# Patient Record
Sex: MALE | Race: WHITE | NOT HISPANIC OR LATINO | Employment: OTHER | URBAN - METROPOLITAN AREA
[De-identification: names, ages, dates, MRNs, and addresses within clinical notes are randomized per-mention and may not be internally consistent; named-entity substitution may affect disease eponyms.]

---

## 2018-09-07 ENCOUNTER — TRANSFERRED RECORDS (OUTPATIENT)
Dept: HEALTH INFORMATION MANAGEMENT | Facility: CLINIC | Age: 68
End: 2018-09-07

## 2018-12-06 ENCOUNTER — OFFICE VISIT (OUTPATIENT)
Dept: CARDIOLOGY | Facility: CLINIC | Age: 68
End: 2018-12-06
Payer: COMMERCIAL

## 2018-12-06 VITALS
DIASTOLIC BLOOD PRESSURE: 88 MMHG | BODY MASS INDEX: 23.86 KG/M2 | OXYGEN SATURATION: 100 % | WEIGHT: 180 LBS | SYSTOLIC BLOOD PRESSURE: 124 MMHG | HEART RATE: 87 BPM | HEIGHT: 73 IN

## 2018-12-06 DIAGNOSIS — I34.0 NON-RHEUMATIC MITRAL REGURGITATION: ICD-10-CM

## 2018-12-06 DIAGNOSIS — I48.20 CHRONIC ATRIAL FIBRILLATION (H): ICD-10-CM

## 2018-12-06 PROCEDURE — 93000 ELECTROCARDIOGRAM COMPLETE: CPT | Performed by: INTERNAL MEDICINE

## 2018-12-06 PROCEDURE — 99214 OFFICE O/P EST MOD 30 MIN: CPT | Mod: 25 | Performed by: INTERNAL MEDICINE

## 2018-12-06 RX ORDER — TAMSULOSIN HYDROCHLORIDE 0.4 MG/1
CAPSULE ORAL DAILY
Refills: 3 | COMMUNITY
Start: 2018-09-07 | End: 2022-09-09

## 2018-12-06 NOTE — MR AVS SNAPSHOT
"              After Visit Summary   12/6/2018    Pan Bills    MRN: [id number]           Patient Information     Date Of Birth          1950        Visit Information        Provider Department      12/6/2018 11:45 AM Austyn Calderon MD [facility]        Today's Diagnoses     Non-rheumatic mitral regurgitation        Chronic atrial fibrillation (H)           Follow-ups after your visit        Additional Services     Follow-Up with Cardiologist                 Future tests that were ordered for you today     Open Future Orders        Priority Expected Expires Ordered    Follow-Up with Cardiologist Routine 12/5/2020 12/25/2020 12/6/2018    Echocardiogram Routine 12/13/2018 12/6/2019 12/6/2018            Who to contact     If you have questions or need follow up information about today's clinic visit or your schedule please contact [facility]   [place] directly at [phone].  Normal or non-critical lab and imaging results will be communicated to you by MyChart, letter or phone within 4 business days after the clinic has received the results. If you do not hear from us within 7 days, please contact the clinic through MyChart or phone. If you have a critical or abnormal lab result, we will notify you by phone as soon as possible.  Submit refill requests through [employer] or call your pharmacy and they will forward the refill request to us. Please allow 3 business days for your refill to be completed.          Additional Information About Your Visit        Care EveryWhere ID     This is your Care EveryWhere ID. This could be used by other organizations to access your [facility] medical records  [id number]        Your Vitals Were     Pulse Height Pulse Oximetry BMI (Body Mass Index)          87 1.854 m (6' 1\") 100% 23.75 kg/m2         Blood Pressure from Last 3 Encounters:   12/06/18 124/88   07/06/16 114/80   06/30/16 110/88    Weight from " Last 3 Encounters:   12/06/18 81.6 kg (180 lb)   07/06/16 82.8 kg (182 lb 9 oz)   06/30/16 83.9 kg (185 lb)              We Performed the Following     EKG 12-lead complete w/read - Clinics (performed today)     Follow-Up with Cardiologist        Primary Care Provider Office Phone # Fax #    Sheeba Alex [phone] [address] [address]        Equal Access to Services     [facility] : Hadii aad ku hadasho Soomaali, waaxda luqadaha, qaybta kaalmada adeegyada, waxay idiin hayaan adeeg kharash lamanoj . So [facility] [phone].    ATENCIÓN: Si habla español, tiene a jacinto disposición servicios gratuitos de asistencia lingüística. Llame al [phone].    We comply with applicable federal civil rights laws and [place] laws. We do not discriminate on the basis of race, color, national origin, age, disability, sex, sexual orientation, or gender identity.            Thank you!     Thank you for choosing [facility]  for your care. Our goal is always to provide you with excellent care. Hearing back from our patients is one way we can continue to improve our services. Please take a few minutes to complete the written survey that you may receive in the mail after your visit with us. Thank you!             Your Updated Medication List - Protect others around you: Learn how to safely use, store and throw away your medicines at www.disposemymeds.org.          This list is accurate as of 12/6/18 12:19 PM.  Always use your most recent med list.                   Brand Name Dispense Instructions for use Diagnosis    tamsulosin 0.4 MG capsule    FLOMAX     daily

## 2018-12-06 NOTE — LETTER
12/6/2018      Sheeba Alex MD  [address] [address]      RE: Pan Bills       Dear Colleague,    I had the pleasure of seeing Pan Bills in the [facility].    Service Date: 12/06/2018      HISTORY OF PRESENT ILLNESS:  Pan Bills, a 68-year-old man with chronic atrial fibrillation, mitral insufficiency, and prostatism was seen today at your request for followup.      Since I last saw Mr. Bills there has been no change in his  excellent exercise tolerance.  He runs at least 3 times a week and can run up to 20 miles at a time..  There has been no chest, arm, neck, jaw or back discomfort with activity.  There has been no unusual dyspnea, edema or orthopnea.  He denies focal neurologic deficit.      PAST MEDICAL HISTORY:   1.  Prostatism - on tamsulosin.   2.  Old history of colon cancer - status post surgery with no recurrence.   3.  Status post umbilical herniorrhaphy.   4.  Chronic atrial fibrillation.   5.  Chronic mitral insufficiency - most recent echo showing normal ejection fraction with moderate mitral insufficiency in 2016.      PHYSICAL EXAMINATION:   GENERAL:  Exam today demonstrates a very pleasant, cooperative and intelligent, soft spoken 68-year-old man.   VITAL SIGNS:  His blood pressure is 128/66, his heart rate was 87 and irregular.  His height is 1.85 meters, his weight is 81.6 kg.  His BMI is 23.8.   LUNGS:  Clear to percussion and auscultation.   CARDIOVASCULAR:  Shows an irregular S1 and S2.  There is no S3.  There is a very soft 1/6 systolic murmur.  His pulses are symmetrical.   EXTREMITIES:  There is no pedal edema.      LABORATORY STUDIES:  His ECG shows atrial fibrillation with a ventricular response rate of 78 beats per minute and an incomplete right bundle branch block pattern.  His ECG is not significantly changed.      ASSESSMENT:  Mr. Bills has chronic atrial fibrillation with a CHADS-VASc score equal to 1.  He is at low  risk for thromboembolic event and in the absence of bleeding complication, daily aspirin therapy is reasonable..  In view of his history of moderate mitral insufficiency, a repeat echo is indicated at this time to make certain there has been no change in the degree of mitral insufficiency or in left ventricular chamber size/ systolic performance.  We will plan to see the patient in about 2 years, unless there is a change on his echo.      RECOMMENDATIONS:   1.  Continue aspirin daily.   2.  Echocardiogram to reassess the degree of mitral insufficiency, left ventricular systolic performance and cardiac chamber size.  We will contact the patient with the results and request an earlier visit should we see significant change.      We greatly appreciate the opportunity to be involved in the care of this most pleasant man.      cc:   Sheeba Alex MD   [facility]   [address] [place] [address]         AUSTYN CALDERON MD             D: 2018   T: 2018   MT: JIL      Name:     MARIFER GUTIERREZ   MRN:      [id number]        Account:      [id number]   :      1950           Service Date: 2018      Document: [id number]           Outpatient Encounter Medications as of 2018   Medication Sig Dispense Refill     tamsulosin (FLOMAX) 0.4 MG capsule daily  3     [DISCONTINUED] aspirin 81 MG chewable tablet Take 81 mg by mouth daily       No facility-administered encounter medications on file as of 2018.        Again, thank you for allowing me to participate in the care of your patient.      Sincerely,    Austyn Calderon MD     [facility]

## 2018-12-06 NOTE — LETTER
"12/6/2018    Sheeba Alex MD  [address]  [facility] [address]    RE: Pan Bills       Dear Colleague,    I had the pleasure of seeing Pan Bills in the [facility].    HISTORY OF PRESENT ILLNESS:  Asymptomatic. Still runs at least 3 times /week.     Orders this Visit:  Orders Placed This Encounter   Procedures     EKG 12-lead complete w/read - Clinics (performed today)     Echocardiogram     Orders Placed This Encounter   Medications     tamsulosin (FLOMAX) 0.4 MG capsule     Sig: daily     Refill:  3     Medications Discontinued During This Encounter   Medication Reason     aspirin 81 MG chewable tablet Medication Reconciliation Clean Up       Encounter Diagnoses   Name Primary?     Non-rheumatic mitral regurgitation      Chronic atrial fibrillation (H)        CURRENT MEDICATIONS:  Current Outpatient Prescriptions   Medication Sig Dispense Refill     tamsulosin (FLOMAX) 0.4 MG capsule daily  3       ALLERGIES   No Known Allergies    PAST MEDICAL, SURGICAL, FAMILY, SOCIAL HISTORY:  History was reviewed and updated as needed, see medical record.    Review of Systems:  A 12-point review of systems was completed, see medical record for detailed review of systems information.    Physical Exam:  Vitals: /88 (BP Location: Right arm, Patient Position: Chair, Cuff Size: Adult Regular)  Pulse 87  Ht 1.854 m (6' 1\")  Wt 81.6 kg (180 lb)  SpO2 100%  BMI 23.75 kg/m2    Constitutional:  cooperative, alert and oriented, well developed, well nourished, in no acute distress        Skin:  warm and dry to the touch, no apparent skin lesions or masses noted        Head:  normocephalic, no masses or lesions        Eyes:  pupils equal and round, conjunctivae and lids unremarkable, sclera white, no xanthalasma, EOMS intact, no nystagmus        ENT:  no pallor or cyanosis, dentition good        Neck:  carotid pulses are full and equal bilaterally, JVP normal, no carotid bruit    "     Chest:  normal breath sounds, clear to auscultation, normal A-P diameter, normal symmetry, normal respiratory excursion, no use of accessory muscles        Cardiac: normal S1 and S2 irregular rhythm                Abdomen:  abdomen soft, non-tender, BS normoactive, no mass, no HSM, no bruits        Vascular:                                        Extremities and Back:  no deformities, clubbing, cyanosis, erythema observed        Neurological:  no gross motor deficits        ASSESSMENT:  Looks great.        RECOMMENDATIONS: TTE to recheck MR  Follow-up in 2 years unless change.  Asa 81 daily.       Recent Lab Results:  LIPID RESULTS:  Lab Results   Component Value Date    CHOL 170 05/05/2014    HDL 67 05/05/2014    LDL 91 05/05/2014    TRIG 62 05/05/2014       LIVER ENZYME RESULTS:  Lab Results   Component Value Date    AST 26 05/05/2014    ALT 20 05/05/2014       CBC RESULTS:  No results found for: WBC, RBC, HGB, HCT, MCV, MCH, MCHC, RDW, PLT    BMP RESULTS:  Lab Results   Component Value Date     05/05/2014    POTASSIUM 4.3 05/05/2014    CHLORIDE 100 05/05/2014    ANIONGAP 1.7 05/05/2014    GLC 87 05/05/2014    BUN 18 05/05/2014    CR 1.00 05/05/2014    CAROLE 9.4 05/05/2014        A1C RESULTS:  No results found for: A1C    INR RESULTS:  No results found for: INR    We greatly appreciate the opportunity to be involved in the care of your patient, Pan Bills.    Sincerely,  Austyn Calderon MD      CC  Sheeba Alex MD  [address]  [place] [address]                                                                       Thank you for allowing me to participate in the care of your patient.      Sincerely,     Austyn Calderon MD     [facility]    cc:   Sheeba Alex MD  [address]  [place] [address]

## 2018-12-06 NOTE — PROGRESS NOTES
Service Date: 12/06/2018      HISTORY OF PRESENT ILLNESS:  Pan Bills, a 68-year-old man with chronic atrial fibrillation, mitral insufficiency, and prostatism was seen today at your request for followup.      Since I last saw Mr. Bills there has been no change in his  excellent exercise tolerance.  He runs at least 3 times a week and can run up to 20 miles at a time..  There has been no chest, arm, neck, jaw or back discomfort with activity.  There has been no unusual dyspnea, edema or orthopnea.  He denies focal neurologic deficit.      PAST MEDICAL HISTORY:   1.  Prostatism - on tamsulosin.   2.  Old history of colon cancer - status post surgery with no recurrence.   3.  Status post umbilical herniorrhaphy.   4.  Chronic atrial fibrillation.   5.  Chronic mitral insufficiency - most recent echo showing normal ejection fraction with moderate mitral insufficiency in 2016.      PHYSICAL EXAMINATION:   GENERAL:  Exam today demonstrates a very pleasant, cooperative and intelligent, soft spoken 68-year-old man.   VITAL SIGNS:  His blood pressure is 128/66, his heart rate was 87 and irregular.  His height is 1.85 meters, his weight is 81.6 kg.  His BMI is 23.8.   LUNGS:  Clear to percussion and auscultation.   CARDIOVASCULAR:  Shows an irregular S1 and S2.  There is no S3.  There is a very soft 1/6 systolic murmur.  His pulses are symmetrical.   EXTREMITIES:  There is no pedal edema.      LABORATORY STUDIES:  His ECG shows atrial fibrillation with a ventricular response rate of 78 beats per minute and an incomplete right bundle branch block pattern.  His ECG is not significantly changed.      ASSESSMENT:  Mr. Bills has chronic atrial fibrillation with a CHADS-VASc score equal to 1.  He is at low risk for thromboembolic event and in the absence of bleeding complication, daily aspirin therapy is reasonable..  In view of his history of moderate mitral insufficiency, a repeat echo is indicated at this time to make  certain there has been no change in the degree of mitral insufficiency or in left ventricular chamber size/ systolic performance.  We will plan to see the patient in about 2 years, unless there is a change on his echo.      RECOMMENDATIONS:   1.  Continue aspirin daily.   2.  Echocardiogram to reassess the degree of mitral insufficiency, left ventricular systolic performance and cardiac chamber size.  We will contact the patient with the results and request an earlier visit should we see significant change.      We greatly appreciate the opportunity to be involved in the care of this most pleasant man.      cc:   Sheeba Alex MD   [facility]   [address] [place] [address]         CANDICE NEGRETE MD             D: 2018   T: 2018   MT: JIL      Name:     MARIFER GUTIERREZ   MRN:      [id number]        Account:      [id number]   :      1950           Service Date: 2018      Document: [id number]

## 2018-12-06 NOTE — PROGRESS NOTES
"HISTORY OF PRESENT ILLNESS:  Asymptomatic. Still runs at least 3 times /week.     Orders this Visit:  Orders Placed This Encounter   Procedures     EKG 12-lead complete w/read - Clinics (performed today)     Echocardiogram     Orders Placed This Encounter   Medications     tamsulosin (FLOMAX) 0.4 MG capsule     Sig: daily     Refill:  3     Medications Discontinued During This Encounter   Medication Reason     aspirin 81 MG chewable tablet Medication Reconciliation Clean Up       Encounter Diagnoses   Name Primary?     Non-rheumatic mitral regurgitation      Chronic atrial fibrillation (H)        CURRENT MEDICATIONS:  Current Outpatient Prescriptions   Medication Sig Dispense Refill     tamsulosin (FLOMAX) 0.4 MG capsule daily  3       ALLERGIES   No Known Allergies    PAST MEDICAL, SURGICAL, FAMILY, SOCIAL HISTORY:  History was reviewed and updated as needed, see medical record.    Review of Systems:  A 12-point review of systems was completed, see medical record for detailed review of systems information.    Physical Exam:  Vitals: /88 (BP Location: Right arm, Patient Position: Chair, Cuff Size: Adult Regular)  Pulse 87  Ht 1.854 m (6' 1\")  Wt 81.6 kg (180 lb)  SpO2 100%  BMI 23.75 kg/m2    Constitutional:  cooperative, alert and oriented, well developed, well nourished, in no acute distress        Skin:  warm and dry to the touch, no apparent skin lesions or masses noted        Head:  normocephalic, no masses or lesions        Eyes:  pupils equal and round, conjunctivae and lids unremarkable, sclera white, no xanthalasma, EOMS intact, no nystagmus        ENT:  no pallor or cyanosis, dentition good        Neck:  carotid pulses are full and equal bilaterally, JVP normal, no carotid bruit        Chest:  normal breath sounds, clear to auscultation, normal A-P diameter, normal symmetry, normal respiratory excursion, no use of accessory muscles        Cardiac: normal S1 and S2 irregular rhythm            "     Abdomen:  abdomen soft, non-tender, BS normoactive, no mass, no HSM, no bruits        Vascular:                                        Extremities and Back:  no deformities, clubbing, cyanosis, erythema observed        Neurological:  no gross motor deficits        ASSESSMENT:  Looks great.        RECOMMENDATIONS: TTE to recheck MR  Follow-up in 2 years unless change.  Asa 81 daily.       Recent Lab Results:  LIPID RESULTS:  Lab Results   Component Value Date    CHOL 170 05/05/2014    HDL 67 05/05/2014    LDL 91 05/05/2014    TRIG 62 05/05/2014       LIVER ENZYME RESULTS:  Lab Results   Component Value Date    AST 26 05/05/2014    ALT 20 05/05/2014       CBC RESULTS:  No results found for: WBC, RBC, HGB, HCT, MCV, MCH, MCHC, RDW, PLT    BMP RESULTS:  Lab Results   Component Value Date     05/05/2014    POTASSIUM 4.3 05/05/2014    CHLORIDE 100 05/05/2014    ANIONGAP 1.7 05/05/2014    GLC 87 05/05/2014    BUN 18 05/05/2014    CR 1.00 05/05/2014    CAROLE 9.4 05/05/2014        A1C RESULTS:  No results found for: A1C    INR RESULTS:  No results found for: INR    We greatly appreciate the opportunity to be involved in the care of your patient, Pan Bills.    Sincerely,  Austyn Calderon MD      CC  Sheeba Alex MD  [address] [place] [address]  [place] [address]

## 2018-12-20 ENCOUNTER — HOSPITAL ENCOUNTER (OUTPATIENT)
Dept: CARDIOLOGY | Facility: CLINIC | Age: 68
Discharge: HOME | End: 2018-12-20
Attending: INTERNAL MEDICINE | Admitting: INTERNAL MEDICINE
Payer: MEDICARE

## 2018-12-20 DIAGNOSIS — I34.0 NON-RHEUMATIC MITRAL REGURGITATION: ICD-10-CM

## 2018-12-20 DIAGNOSIS — I48.20 CHRONIC ATRIAL FIBRILLATION (H): ICD-10-CM

## 2018-12-20 PROCEDURE — 93306 TTE W/DOPPLER COMPLETE: CPT | Mod: 26 | Performed by: INTERNAL MEDICINE

## 2018-12-20 PROCEDURE — 93306 TTE W/DOPPLER COMPLETE: CPT

## 2018-12-21 ENCOUNTER — TELEPHONE (OUTPATIENT)
Dept: CARDIOLOGY | Facility: CLINIC | Age: 68
End: 2018-12-21

## 2018-12-21 NOTE — TELEPHONE ENCOUNTER
Received the following result note from Dr. Calderon:     Notes recorded by Austyn Calderon MD on 12/21/2018 at 8:09 AM CST  Please inform the patient I have personally reviewed his TTE. He has moderate MR, unchanged from last TTE with excellent LVEF and normal chamber sizes. We will plan to follow up in 2 years as stated in note.Kvng    Called and spoke with pt. Communicated results and recommendations as stated by Dr. Calderon. Pt verbalized understanding, no further questions at this time.

## 2022-08-26 ENCOUNTER — HOSPITAL ENCOUNTER (OUTPATIENT)
Dept: ULTRASOUND IMAGING | Facility: CLINIC | Age: 72
Discharge: HOME | End: 2022-08-26
Attending: FAMILY MEDICINE | Admitting: FAMILY MEDICINE
Payer: COMMERCIAL

## 2022-08-26 DIAGNOSIS — M79.661 RIGHT CALF PAIN: ICD-10-CM

## 2022-08-26 PROCEDURE — 93971 EXTREMITY STUDY: CPT | Mod: RT

## 2022-09-09 ENCOUNTER — OFFICE VISIT (OUTPATIENT)
Dept: VASCULAR SURGERY | Facility: CLINIC | Age: 72
End: 2022-09-09
Payer: COMMERCIAL

## 2022-09-09 DIAGNOSIS — I83.891 VARICOSE VEINS OF LEG WITH SWELLING, RIGHT: Primary | ICD-10-CM

## 2022-09-09 PROCEDURE — 99203 OFFICE O/P NEW LOW 30 MIN: CPT | Performed by: SURGERY

## 2022-09-09 RX ORDER — ASPIRIN 81 MG/1
81 TABLET, CHEWABLE ORAL DAILY
COMMUNITY

## 2022-09-09 NOTE — NURSING NOTE
Patient Reported symptoms:    Right leg   Heaviness A little of the time   Achiness A little of the time   Swelling All of the time  Throbbing None of the time   Itching None of the time   Appearance Extremely noticeable   Impact on work/activities Symptoms but full able to participate    Left Leg   Heaviness A little of the time   Achiness A little of the time   Swelling A little of the time   Throbbing None of the time   Itching None of the time   Appearance Extremely noticeable   Impact on work/activities Symptoms but full able to participate

## 2022-09-09 NOTE — LETTER
9/9/2022         RE: Pan Bills  [address] [facility] [address]        Dear Colleague,    Thank you for referring your patient, Pan Bills, to the [facility] [place]. Please see a copy of my visit note below.    VEINSOLUTIONS CONSULTATION    HPI:    Pan Bills is a pleasant 71 year old male referred by Dr. Sheeba Alex for evaluation of increased swelling of his right lower extremity.  I treated the patient  In 2012 bilaterally for chronic venous insufficiency.  He says he is always had swelling of his right lower extremity but that he slipped and bumped the lateral aspect of the distal right leg on his deck causing significant swelling of his right lower extremity.  He has had no pleuritic chest pain, shortness of breath and has continue to wear compression on a daily basis as he always has.  Unfortunately, the swelling has been more difficult to control.  The swelling of the leg makes it difficult for him to run, causing him to have to ice his leg on a daily basis.  Icing helps control the swelling somewhat.    He had a right lower extremity ultrasound to rule out a deep vein thrombosis on 8/26/2022 which was negative for deep vein thrombosis.    The patient has no history of deep a vein thrombosis, superficial thrombophlebitis or hemorrhage.    Family history is significant for varicose veins.    PAST MEDICAL HISTORY:   Past Medical History:   Diagnosis Date     Atrial fibrillation (H)      Cancer (H)     COLON     Mitral valve regurgitation     Mod-severe MR (2-3+) per 5/14 echo       PAST SURGICAL HISTORY:   Past Surgical History:   Procedure Laterality Date     COLONOSCOPY N/A 10/1/2015    Procedure: COLONOSCOPY;  Surgeon: Markus Moore MD;  Location:  GI     GI SURGERY      partial colon resection for CA     HERNIA REPAIR      (R) inguinal  hernia repair     HERNIORRHAPHY EPIGASTRIC N/A 7/6/2016    Procedure: HERNIORRHAPHY EPIGASTRIC;  Surgeon:  Shorty Salomon MD;  Location: [facility]     HERNIORRHAPHY INGUINAL Right 2016    Procedure: HERNIORRHAPHY INGUINAL;  Surgeon: Shorty Salomon MD;  Location: [facility]     VASCULAR SURGERY      VARICOSE VEIN SURGERY BILAT       FAMILY HISTORY:   Family History   Problem Relation Age of Onset     Respiratory Mother      Cancer Father 65        Lung CA     Diabetes Maternal Grandmother      Cardiovascular Brother         Royal Vazquez       SOCIAL HISTORY:   Social History     Tobacco Use     Smoking status: Current Some Day Smoker     Packs/day: 0.50     Years: 2.00     Pack years: 1.00     Types: Cigars     Last attempt to quit: 1969     Years since quittin.7     Smokeless tobacco: Never Used     Tobacco comment: pt smokes 1 cigar per week   Substance Use Topics     Alcohol use: No     Alcohol/week: 0.0 standard drinks       REVIEW OF SYSTEMS: Review Of Systems  Skin: negative  Eyes: negative  Ears/Nose/Throat: negative  Respiratory: No shortness of breath, dyspnea on exertion, cough, or hemoptysis  Cardiovascular: irregular heart beat  Gastrointestinal: negative  Genitourinary: negative  Musculoskeletal: Leg swelling  Neurologic: negative  Psychiatric: negative  Hematologic/Lymphatic/Immunologic: negative  Endocrine: negative      Vital signs:  There were no vitals taken for this visit.    Current Outpatient Medications   Medication Sig Dispense Refill     aspirin (ASA) 81 MG chewable tablet Take 81 mg by mouth daily         PHYSICAL EXAM:  General: Pleasant, NAD.   HEENT: Normocephalic, atraumatic, external ears and nose normal.   Respiratory: Normal respiratory effort.   Cardiovascular: Pulse is regular.   Musculoskeletal: Gait and station normal.  The joints of his fingers and toes without deformity.  There is no cyanosis of his nailbeds.   EXTREMITIES: Right lower extremity: Scattered 4 to 5 mm varicosities over the distal thigh and proximal calf.  3+ edema of the leg from the mid leg to the  ankle with hyperpigmentation over the distal medial right leg.  There is a scar on the distal medial right leg but he is not sure if he is ever had an ulcer.    Left lower extremity: 4 to 5 mm varicosities from the distal medial left thigh coursing down the medial left calf.  1+ edema of the left ankle with some mild stasis hyperpigmentation..    PULSES: R/L (3=normal pulse, 0=no palpable pulse) dorsalis pedis: 2/0; posterior tibial: 3/3.      Neurologic: Grossly normal  Psychiatric: Mood, affect, judgment and insight are normal     Venous Ultrasound:   VENOUS ULTRASOUND RIGHT LOWER EXTREMITY  8/26/2022 11:45 AM      HISTORY: Rule out DVT. Right calf pain.     COMPARISON: None.     TECHNIQUE: Color Doppler and spectral waveform analysis performed  throughout the deep veins of the right lower extremity.     FINDINGS: The right common femoral, proximal greater saphenous,  femoral, and popliteal veins demonstrate normal blood flow,  compression, and augmentation. Posterior tibial and peroneal veins are  compressible. Varicosities noted in the proximal to distal calf, found  to be patent.                                                                      IMPRESSION:  1. Negative for DVT in the right lower extremity.  2. Patent varicosities in the calf.     SIMIN RAMÍREZ MD     ASSESSMENT:  Posttraumatic swelling of the right lower extremity, not associated with deep vein thrombosis.  He states his right leg is always been somewhat more swollen than the left and has remained so despite wearing compression hose.  Currently, however, the swelling is such that it makes it difficult for him to run which is part of his regular exercise routine.    He needs a right lower extremity venous competency study to assess for underlying valve incompetence.  If there is significant axial incompetence that can be treated, we will address it.  He may need lymphedema treatment with manual lymph drainage and wrapping techniques.  This  was discussed with him.    PLAN:  Right lower extremity venous competency study with video visit to discuss results.     Cosme Mccray MD    Dictated using Dragon voice recognition software which may result in transcription errors          VEIN CLINIC LEG DRAWING:                  Again, thank you for allowing me to participate in the care of your patient.        Sincerely,        Cosme Mccray MD

## 2022-09-09 NOTE — PATIENT INSTRUCTIONS
Varicose Veins and Spider Veins    Varicose veins are swollen, enlarged veins most often found in the legs. They are usually blue or purple in color and may bulge, twist, and stand out under the skin. Spider veins are small veins just under your skin that can look red, blue or purple.        Normally, veins return blood from the body to the heart. The leg veins have one-way valves that prevent blood from flowing backward in the vein. When the valves are weak or damaged, blood backs up in the veins. This may cause some of the veins to swell and bulge and become varicose veins.     Symptoms  Varicose veins may or may not cause symptoms. If symptoms do occur, they can include:  Legs that feel tired, achy, heavy, or itchy  Leg swelling  Leg muscle cramps  Skin changes, such as discoloration, dryness, redness, or rash (in more severe cases, you may also have sores on the skin called venous leg ulcers)    Risk factors  There are a number of factors that increase the risk for varicose veins. These can include:   Being a woman  Being older  Sitting or standing for long periods  Being overweight  Being pregnant  Having a family history of varicose veins  Hormones, birth control pills    Treatment starts with simple self-help measures (see below). If these don't help, there are many procedures that can be done to shrink or remove varicose veins. Your healthcare provider can tell you more about these options, if needed.     Home care  Support or compression stockings will likely be prescribed. If so, be sure to wear them as directed. They may help improve blood flow.  Exercising helps strengthen your leg muscles and improve blood flow. To get the most benefit, choose exercises such as walking, swimming, or cycling. Also try to exercise for at least 30 minutes on most days.  Raising your legs above heart level will help relieve swelling and keep blood from pooling in veins. Try to elevate your legs for 15 to 20 minutes at  the end of the day, and whenever you're relaxing. To make sure your legs are raised above heart level, prop them up on cushions or large pillows.  To keep blood moving when you have to sit or stand for long periods, try these tips:  At work, take walking breaks instead of coffee breaks. Walk during your lunch hour. Or try flexing your feet up and down 10 times each hour.  When standing, raise yourself up and down on your toes, or rock back and forth on your heels.  If you are overweight, talk with your healthcare provider about setting up a weight-loss plan. Maintaining a healthy weight can help reduce the strain on your veins. It may also improve symptoms, such as swelling and aching.  If you have dryness and itching, ask your provider about special lotions that can be applied to the skin to help improve symptoms.     Follow-up care  Follow up with your healthcare provider, or as directed. If imaging tests were done, you'll be told the results and if there are any new findings that affect your care.     When to seek medical advice  Call your healthcare provider right away if any of these occur:  Sudden, severe leg swelling, pain, or redness  Symptoms worsen, or they don't improve with self-care  Bleeding from any affected veins  Ulcers form on the legs, ankles, or feet  Fever of 100.4 F (38 C) or higher, or as advised by your provider    [employer] last reviewed this educational content on 4/1/2018 2000-2021 [employer]. All rights reserved. This information is not intended as a substitute for professional medical care. Always follow your healthcare professional's instructions.    Self-Care for Spider and Varicose Veins    Your healthcare provider may suggest that you try self-care. Exercising and maintaining a healthy weight may keep problem veins from getting worse. Wearing elastic stockings and elevating your legs can help improve blood flow. Taking breaks when you sit or stand helps,  too.        Wearing compression stockings  Compression stockings gently squeeze veins so blood flows upward. If you need compression stockings, your healthcare provider can prescribe them for you. Follow your healthcare provider's advice about how and when to wear them. Compression stockings come in several different levels of pressure. Ask your healthcare provider which level of pressure would help you the most.     Raising your legs above heart level will help relieve swelling and keep blood from pooling in veins. Try to elevate your legs for 15 to 20 minutes at the end of the day, and whenever you're relaxing. To make sure your legs are raised above heart level, prop them up on cushions or large pillows.    To keep blood moving when you have to sit or stand for long periods, try these tips:  At work, take walking breaks instead of coffee breaks. Walk during your lunch hour. Or try flexing your feet up and down 10 times each hour.  When standing, raise yourself up and down on your toes, or rock back and forth on your heels.    [employer] last reviewed this educational content on 11/1/2019 2000-2021 [employer]. All rights reserved. This information is not intended as a substitute for professional medical care. Always follow your healthcare professional's instructions.    Treatment Options    Sclerotherapy  Your healthcare provider will inject the vein with a special chemical that will quickly close the vein from the inside. This is particularly useful for spider veins and smaller varicose veins.      If you have large varicose veins, surgery may be the best choice. But it will not prevent new varicose veins from forming. Surgery is most often done in a surgery center or in the office.    If surgery is recommended for you, your surgery will be tailored to your needs. Varicose veins may be tied off (ligation), destroyed, or removed. Blood will then flow through the healthy veins. One or more of the  following techniques may be used:    Ablation (laser or radiofrequency)  A tiny cut in the skin is made near the varicose vein. A small tube called a catheter is inserted into the vein. Energy or heat released from the catheter tip will make the vein walls collapse and stick together, stopping all blood flow through the vein.         Ablation (glue)  A tiny cut in the skin is made near the varicose vein. A small tube called a catheter is inserted into the vein. Droplets of glue are deposited into the vein to make vein walls collapse and stick together stopping blood flow through the vein.    Microphlebectomy or ambulatory phlebectomy  A special hook is used to gently take out a varicose vein through tiny incisions. Microphlebectomy may be done in your healthcare provider's office.      Vein stripping and ligation (rare)  In more severe cases, the surgeon may tie off and remove veins by making smaller cuts in the skin. Smaller branching veins may also be tied off or removed.    Know about the risks  Your healthcare provider will talk with you about the risks of surgery. These include:  Bleeding or swelling  A sense of numbness, burning, or tingling in areas near the procedure  Edema or swelling in the legs  Clots in the deep veins that may travel to the lungs  Infection  Scarring  Inflammation related to the glue    [employer] last reviewed this educational content on 11/1/2019 (Sclerotherapy image) 12/1/2019 (Radiofrequency ablation image, Microphlebectomy image)    9805-0962 [employer]. All rights reserved. This information is not intended as a substitute for professional medical care. Always follow your healthcare professional's instructions.

## 2022-09-19 ENCOUNTER — ANCILLARY PROCEDURE (OUTPATIENT)
Dept: ULTRASOUND IMAGING | Facility: CLINIC | Age: 72
End: 2022-09-19
Attending: SURGERY
Payer: COMMERCIAL

## 2022-09-19 ENCOUNTER — VIRTUAL VISIT (OUTPATIENT)
Dept: VASCULAR SURGERY | Facility: CLINIC | Age: 72
End: 2022-09-19
Attending: SURGERY
Payer: COMMERCIAL

## 2022-09-19 DIAGNOSIS — I83.891 VARICOSE VEINS OF LEG WITH SWELLING, RIGHT: ICD-10-CM

## 2022-09-19 DIAGNOSIS — I83.891 VARICOSE VEINS OF LEG WITH EDEMA, RIGHT: Primary | ICD-10-CM

## 2022-09-19 DIAGNOSIS — I83.811 VARICOSE VEINS OF RIGHT LOWER EXTREMITY WITH PAIN: ICD-10-CM

## 2022-09-19 DIAGNOSIS — I83.891 VARICOSE VEINS OF LEG WITH SWELLING, RIGHT: Primary | ICD-10-CM

## 2022-09-19 PROCEDURE — 99213 OFFICE O/P EST LOW 20 MIN: CPT | Mod: 95 | Performed by: SURGERY

## 2022-09-19 PROCEDURE — 93971 EXTREMITY STUDY: CPT | Mod: RT | Performed by: SURGERY

## 2022-09-19 NOTE — PROGRESS NOTES
Pan is a 71 year old who is being evaluated via a billable video visit.      How would you like to obtain your AVS? MyChart  If the video visit is dropped, the invitation should be resent by: Text to cell phone: [phone]  Will anyone else be joining your video visit? No      Video-Visit Details    Video Start Time: 3:35 PM    Type of service:  Video Visit    Video End Time:3:47 PM    Originating Location (pt. Location): Home    Distant Location (provider location):  [facility]     Platform used for Video Visit: ARI     [facility] [place] Progress Note    Pan Bills presents in follow-up of right lower extremity significantly increased swelling.  Please see my consultation of 9/9/2022 for details.  He returned on 9/19/2022 for a right lower extremity venous competency study in an effort to find the source of the swelling.    Physical Exam  General: Pleasant male in no acute distress.  Blood pressure 136/90, pulse 95  Extremities: Right lower extremity: Scattered 4 to 5 mm varicosities over the distal thigh and proximal calf.  3+ edema of the leg from the mid leg to the ankle with hyperpigmentation over the distal medial right leg.  There is a scar on the distal medial right leg but he is not sure if he is ever had an ulcer.    Ultrasound:  INDICATION:  Rt leg swelling; hx of ablation in 2008     EXAM TYPE  RIGHT LOWER EXTREMITY VENOUS DUPLEX FOR VENOUS INSUFFICIENCY  TECHNICAL SUMMARY     A duplex ultrasound study using color flow was performed, to evaluate the right lower extremity veins for valvular incompetence with the patient in a steep reversed trendelenberg.      RIGHT:     The deep veins demonstrate phasic flow, compress and respond to augmentations.  There is no  DVT. The mid to distal femoral vein is a bifid system.  The common femoral, proximal femoral, both of bifid mid femoral, one of distal femoral and popliteal veins are  incompetent  and free of thrombus.      The GSV is not seen 5 cm below the SFJ to the proximal calf. The visualized segments of the GSV demonstrates phasic flow, compresses and responds to augmentations  with no evidence of thrombus. The great saphenous vein measures 5.8 mm at the saphenofemoral junction, and is not seen in the proximal thigh to at the knee. The GSV measures 4.1 mm at the mid calf. The GSV is incompetent from Mid Calf  to Distal Calf, with the greatest reflux time of 2936 milliseconds.  .  The GSV gives rise to multiple incompetent varicose veins, the largest measures 6.6 mm off the Distal Calf that courses Medial with a reflux time of 841 milliseconds.     The AASV is competent ( 2.8 mm) draining into the saphenofemoral junction.      The Giacomini vein is incompetent ( 5.2 mm) communicating with the small saphenous vein at the knee level. The Giacomini vein is incompetent from the distal thigh to the mid thigh with a reflux time of 1105 milliseconds. The Giacomini vein is not seen in the proximal thigh.      Chronic occlusive thrombus is seen in the SPJ flush to the popliteal vein. Chronic non-occlusive thrombus is seen in the proximal calf SSV and chronic occlusive thrombus is seen in the mid calf SSV. The SSV demonstrates phasic flow, compresses and responds to augmentations at the distal calf.   The SSV is incompetent at the proximal calf and distal calf, with a reflux time of 5691 milliseconds.  The SSV gives rise to multiple incompetent varicose veins, the largest measuring 4.8 mm off the Distal Calf that courses Medial with a reflux time of 1534 milliseconds.     Perforators: There is an incompetent  vein ( 1.8  mm) at the medial mallelous that communicates with an incompetent varicose vein.     LEFT:     The CFV demonstrate phasic flow, compress and respond to augmentations.  There is no DVT.       FINAL SUMMARY:     1.   No evidence of deep vein thrombosis in either lower extremity  2.  Right  common femoral through popliteal vein incompetence  3.  Chronic, occlusive and nonocclusive thrombus in right small saphenous vein from the saphenopopliteal junction to the mid to distal calf  4.    Right great saphenous vein absent 5 cm below the saphenofemoral junction to the mid calf  5.  Right mid to distal calf great saphenous vein incompetence  6.  Right Vein of Giacomini incompetence  7.  Segmental incompetence right small saphenous vein  8.  Multiple incompetent great saphenous and small saphenous vein varicosities           The time of incompetence is greater than 500 milliseconds in the superficial and  veins and greater than 1000 milliseconds in the deep veins.     Assessment:  Significantly increased swelling after bumping his leg on the deck 2 and half months ago.  I do not see the source of the significant increase in the swelling based on the ultrasound findings.  Though he has deep vein incompetence, this would not explain the acute change.    Either the increased swelling is related to lymphatic injury or from a impairment and outflow from the iliac system.    Before I refer the patient to a lymphedema therapist, I want to ensure that there is no abnormal flow in the right iliac system and vena cava.  I will therefore have the patient return for inferior vena cava ultrasound.  This was discussed with the patient who voiced understanding and his questions were answered.    Plan:  Ileal caval ultrasound.  If no good source for the swelling is identified, I will refer the patient to lymphedema therapy.    Cosme Mccray MD    Dictated using Dragon voice recognition software which may result in transcription errors

## 2022-09-19 NOTE — LETTER
9/19/2022         RE: Pan Bills  [address] [facility] [address]        Dear Colleague,    Thank you for referring your patient, Pan Bills, to the [facility] VE[facility]. Please see a copy of my visit note below.    Pan is a 71 year old who is being evaluated via a billable video visit.      How would you like to obtain your AVS? MyChart  If the video visit is dropped, the invitation should be resent by: Text to cell phone: [phone]  Will anyone else be joining your video visit? No      Video-Visit Details    Video Start Time: 3:35 PM    Type of service:  Video Visit    Video End Time:3:47 PM    Originating Location (pt. Location): Home    Distant Location (provider location):  [facility]     Platform used for Video Visit: Fototw[employer]     [facility]in [facility] Progress Note    Pan Bills presents in follow-up of right lower extremity significantly increased swelling.  Please see my consultation of 9/9/2022 for details.  He returned on 9/19/2022 for a right lower extremity venous competency study in an effort to find the source of the swelling.    Physical Exam  General: Pleasant male in no acute distress.  Blood pressure 136/90, pulse 95  Extremities: Right lower extremity: Scattered 4 to 5 mm varicosities over the distal thigh and proximal calf.  3+ edema of the leg from the mid leg to the ankle with hyperpigmentation over the distal medial right leg.  There is a scar on the distal medial right leg but he is not sure if he is ever had an ulcer.    Ultrasound:  INDICATION:  Rt leg swelling; hx of ablation in 2008     EXAM TYPE  RIGHT LOWER EXTREMITY VENOUS DUPLEX FOR VENOUS INSUFFICIENCY  TECHNICAL SUMMARY     A duplex ultrasound study using color flow was performed, to evaluate the right lower extremity veins for valvular incompetence with the patient in a steep reversed trendelenberg.      RIGHT:     The deep  veins demonstrate phasic flow, compress and respond to augmentations.  There is no  DVT. The mid to distal femoral vein is a bifid system.  The common femoral, proximal femoral, both of bifid mid femoral, one of distal femoral and popliteal veins are  incompetent and free of thrombus.      The GSV is not seen 5 cm below the SFJ to the proximal calf. The visualized segments of the GSV demonstrates phasic flow, compresses and responds to augmentations  with no evidence of thrombus. The great saphenous vein measures 5.8 mm at the saphenofemoral junction, and is not seen in the proximal thigh to at the knee. The GSV measures 4.1 mm at the mid calf. The GSV is incompetent from Mid Calf  to Distal Calf, with the greatest reflux time of 2936 milliseconds.  .  The GSV gives rise to multiple incompetent varicose veins, the largest measures 6.6 mm off the Distal Calf that courses Medial with a reflux time of 841 milliseconds.     The AASV is competent ( 2.8 mm) draining into the saphenofemoral junction.      The Giacomini vein is incompetent ( 5.2 mm) communicating with the small saphenous vein at the knee level. The Giacomini vein is incompetent from the distal thigh to the mid thigh with a reflux time of 1105 milliseconds. The Giacomini vein is not seen in the proximal thigh.      Chronic occlusive thrombus is seen in the SPJ flush to the popliteal vein. Chronic non-occlusive thrombus is seen in the proximal calf SSV and chronic occlusive thrombus is seen in the mid calf SSV. The SSV demonstrates phasic flow, compresses and responds to augmentations at the distal calf.   The SSV is incompetent at the proximal calf and distal calf, with a reflux time of 5691 milliseconds.  The SSV gives rise to multiple incompetent varicose veins, the largest measuring 4.8 mm off the Distal Calf that courses Medial with a reflux time of 1534 milliseconds.     Perforators: There is an incompetent  vein ( 1.8  mm) at the medial  mallelous that communicates with an incompetent varicose vein.     LEFT:     The CFV demonstrate phasic flow, compress and respond to augmentations.  There is no DVT.       FINAL SUMMARY:     1.   No evidence of deep vein thrombosis in either lower extremity  2.  Right common femoral through popliteal vein incompetence  3.  Chronic, occlusive and nonocclusive thrombus in right small saphenous vein from the saphenopopliteal junction to the mid to distal calf  4.    Right great saphenous vein absent 5 cm below the saphenofemoral junction to the mid calf  5.  Right mid to distal calf great saphenous vein incompetence  6.  Right Vein of Giacomini incompetence  7.  Segmental incompetence right small saphenous vein  8.  Multiple incompetent great saphenous and small saphenous vein varicosities           The time of incompetence is greater than 500 milliseconds in the superficial and  veins and greater than 1000 milliseconds in the deep veins.     Assessment:  Significantly increased swelling after bumping his leg on the deck 2 and half months ago.  I do not see the source of the significant increase in the swelling based on the ultrasound findings.  Though he has deep vein incompetence, this would not explain the acute change.    Either the increased swelling is related to lymphatic injury or from a impairment and outflow from the iliac system.    Before I refer the patient to a lymphedema therapist, I want to ensure that there is no abnormal flow in the right iliac system and vena cava.  I will therefore have the patient return for video cable ultrasound.  This was discussed with the patient who voiced understanding and his questions were answered.    Plan:  Ileal caval ultrasound.  If no good source for the swelling is identified, I will refer the patient to lymphedema therapy.    Cosme Mccray MD    Dictated using Dragon voice recognition software which may result in transcription  errors            Again, thank you for allowing me to participate in the care of your patient.        Sincerely,        Cosme Mccray MD

## 2022-09-20 DIAGNOSIS — M79.89 SWELLING OF LIMB: Primary | ICD-10-CM

## 2022-09-20 DIAGNOSIS — I86.8 VARICOSE VEINS OF OTHER SPECIFIED SITES: ICD-10-CM

## 2022-09-20 DIAGNOSIS — I83.891 VARICOSE VEINS OF LEG WITH EDEMA, RIGHT: ICD-10-CM

## 2022-12-06 ENCOUNTER — TRANSFERRED RECORDS (OUTPATIENT)
Dept: HEALTH INFORMATION MANAGEMENT | Facility: CLINIC | Age: 72
End: 2022-12-06

## 2022-12-18 ENCOUNTER — HEALTH MAINTENANCE LETTER (OUTPATIENT)
Age: 72
End: 2022-12-18

## 2022-12-29 ENCOUNTER — OFFICE VISIT (OUTPATIENT)
Dept: VASCULAR SURGERY | Facility: CLINIC | Age: 72
End: 2022-12-29
Attending: SURGERY
Payer: COMMERCIAL

## 2022-12-29 ENCOUNTER — ANCILLARY PROCEDURE (OUTPATIENT)
Dept: ULTRASOUND IMAGING | Facility: CLINIC | Age: 72
End: 2022-12-29
Attending: SURGERY
Payer: COMMERCIAL

## 2022-12-29 DIAGNOSIS — I86.8 VARICOSE VEINS OF OTHER SPECIFIED SITES: ICD-10-CM

## 2022-12-29 DIAGNOSIS — M79.89 SWELLING OF LIMB: ICD-10-CM

## 2022-12-29 DIAGNOSIS — I89.0 LYMPHEDEMA OF BOTH LOWER EXTREMITIES: Primary | ICD-10-CM

## 2022-12-29 PROCEDURE — 93978 VASCULAR STUDY: CPT | Performed by: SURGERY

## 2022-12-29 PROCEDURE — 99213 OFFICE O/P EST LOW 20 MIN: CPT | Performed by: SURGERY

## 2022-12-29 NOTE — LETTER
2022         RE: Pan Bills  [address] [facility] [address]        Dear Colleague,    Thank you for referring your patient, Pan Bills, to the [facility] [place]. Please see a copy of my visit note below.    [facility] Vein [facility] Progress Note    Pan Bills presents in follow-up of his right greater than left lower extremity.  Please see my office note of 2022 and 2022 for details.  He returns today for ultrasound of his iliac veins and vena cava.    Physical Exam  General: Pleasant male in no acute distress.   Extremities: Right lower extremity: 3+ edema from below the knee into his foot.  5 to 6 mm varicosities coursing down the right popliteal fossa onto the posterior right calf.  Induration in the varicosities in the right popliteal crease consistent with past superficial thrombophlebitis.  No acute inflammation.    Left lower extremity: 1-2+ edema of the left leg, below the knee, into the foot.    Ultrasound:  Name:  Pan Bills                                                 Patient ID: [id number]  Date: 2022                                                      : 1950  Sex: male                                                                    Examined by: TORI Ruiz RVT  Age:  72 year old                                                         Reading MD: GARRETT Mccray MD     INDICATIONS:    Rt leg swelling     EXAM TYPE  IVC/ILIAC VEIN COMPLETE     TECHNICAL SUMMARY     Multiple transverse and longitudinal images of IVC and bilateral iliacs veins were obtained.           The IVC (25mm)  demonstrates phasic flow. No evidence for thrombus or stenosis.     RIGHT:       The Common Iliac vein (14.6 mm) and External Iliac vein (18 mm) demonstrate phasic flow.  No evidence of thrombus or stenosis on this exam.      LEFT:       The Common Iliac vein (15.4 mm) and External Iliac vein (14.9 mm)  demonstrate phasic flow. No evidence of thrombus or stenosis on this exam.         FINAL SUMMARY:  1.  Widely patent bilateral external iliac, common iliac veins with no evidence of stenosis or thrombosis  2.  Widely patent inferior vena cava with no evidence of stenosis or thrombosis     LICO Mccray MD, FACS          Assessment:  The lymphedema of his right lower extremity does not appear to be on the basis of significant venous obstruction or insufficiency.  Increased swelling of his right lower extremity resulted after he bumped his right lower extremity.    The swelling is improving but is still significantly asymmetric as compared to his left lower extremity.  There is no significant edema of his thighs.  The patient continues to run on nearly a daily basis but admits that the right leg is heavy.    I recommend lymphedema therapy for him to see if we can reduce the limb volume.  I do not feel that treating his right small saphenous vein incompetence would be of significant benefit in reducing the swelling, therefore I will recommend observation of his superficial venous insufficiency.    Plan:  Lymphedema therapy referral    Cosme Mccray MD    Dictated using Dragon voice recognition software which may result in transcription errors          Again, thank you for allowing me to participate in the care of your patient.        Sincerely,        Cosme Mccray MD

## 2022-12-29 NOTE — PROGRESS NOTES
[facility] Clinic [place] Progress Note    Pan Bills presents in follow-up of his right greater than left lower extremity.  Please see my office note of 2022 and 2022 for details.  He returns today for ultrasound of his iliac veins and vena cava.    Physical Exam  General: Pleasant male in no acute distress.   Extremities: Right lower extremity: 3+ edema from below the knee into his foot.  5 to 6 mm varicosities coursing down the right popliteal fossa onto the posterior right calf.  Induration in the varicosities in the right popliteal crease consistent with past superficial thrombophlebitis.  No acute inflammation.    Left lower extremity: 1-2+ edema of the left leg, below the knee, into the foot.    Ultrasound:  Name:  Pan Bills                                                 Patient ID: [id number]  Date: 2022                                                      : 1950  Sex: male                                                                    Examined by: TORI Ruiz RVT  Age:  72 year old                                                         Reading MD: GARRETT Mccray MD     INDICATIONS:    Rt leg swelling     EXAM TYPE  IVC/ILIAC VEIN COMPLETE     TECHNICAL SUMMARY     Multiple transverse and longitudinal images of IVC and bilateral iliacs veins were obtained.           The IVC (25mm)  demonstrates phasic flow. No evidence for thrombus or stenosis.     RIGHT:       The Common Iliac vein (14.6 mm) and External Iliac vein (18 mm) demonstrate phasic flow.  No evidence of thrombus or stenosis on this exam.      LEFT:       The Common Iliac vein (15.4 mm) and External Iliac vein (14.9 mm) demonstrate phasic flow. No evidence of thrombus or stenosis on this exam.         FINAL SUMMARY:  1.  Widely patent bilateral external iliac, common iliac veins with no evidence of stenosis or thrombosis  2.  Widely patent inferior vena cava with no evidence of  stenosis or thrombosis     LICO Mccray MD, FACS          Assessment:  The lymphedema of his right lower extremity does not appear to be on the basis of significant venous obstruction or insufficiency.  Increased swelling of his right lower extremity resulted after he bumped his right lower extremity.    The swelling is improving but is still significantly asymmetric as compared to his left lower extremity.  There is no significant edema of his thighs.  The patient continues to run on nearly a daily basis but admits that the right leg is heavy.    I recommend lymphedema therapy for him to see if we can reduce the limb volume.  I do not feel that treating his right small saphenous vein incompetence would be of significant benefit in reducing the swelling, therefore I will recommend observation of his superficial venous insufficiency.    Plan:  Lymphedema therapy referral    Cosme Mccray MD    Dictated using Dragon voice recognition software which may result in transcription errors

## 2023-01-23 ENCOUNTER — HOSPITAL ENCOUNTER (OUTPATIENT)
Dept: OCCUPATIONAL THERAPY | Facility: CLINIC | Age: 73
Discharge: HOME | End: 2023-01-23
Payer: COMMERCIAL

## 2023-01-23 DIAGNOSIS — I89.0 LYMPHEDEMA: Primary | ICD-10-CM

## 2023-01-23 PROCEDURE — 97165 OT EVAL LOW COMPLEX 30 MIN: CPT | Mod: GO | Performed by: OCCUPATIONAL THERAPIST

## 2023-01-23 PROCEDURE — 97140 MANUAL THERAPY 1/> REGIONS: CPT | Mod: GO | Performed by: OCCUPATIONAL THERAPIST

## 2023-01-24 ENCOUNTER — HOSPITAL ENCOUNTER (OUTPATIENT)
Facility: CLINIC | Age: 73
Discharge: HOME | End: 2023-01-24
Attending: COLON & RECTAL SURGERY | Admitting: COLON & RECTAL SURGERY
Payer: COMMERCIAL

## 2023-01-24 VITALS
DIASTOLIC BLOOD PRESSURE: 83 MMHG | HEART RATE: 70 BPM | RESPIRATION RATE: 34 BRPM | OXYGEN SATURATION: 99 % | SYSTOLIC BLOOD PRESSURE: 107 MMHG

## 2023-01-24 LAB — COLONOSCOPY: NORMAL

## 2023-01-24 PROCEDURE — 88305 TISSUE EXAM BY PATHOLOGIST: CPT | Mod: TC | Performed by: COLON & RECTAL SURGERY

## 2023-01-24 PROCEDURE — 45385 COLONOSCOPY W/LESION REMOVAL: CPT | Performed by: COLON & RECTAL SURGERY

## 2023-01-24 PROCEDURE — G0500 MOD SEDAT ENDO SERVICE >5YRS: HCPCS | Performed by: COLON & RECTAL SURGERY

## 2023-01-24 PROCEDURE — 250N000011 HC RX IP 250 OP 636: Performed by: COLON & RECTAL SURGERY

## 2023-01-24 RX ORDER — NALOXONE HYDROCHLORIDE 0.4 MG/ML
0.2 INJECTION, SOLUTION INTRAMUSCULAR; INTRAVENOUS; SUBCUTANEOUS
Status: DISCONTINUED | OUTPATIENT
Start: 2023-01-24 | End: 2023-01-24 | Stop reason: HOSPADM

## 2023-01-24 RX ORDER — ONDANSETRON 4 MG/1
4 TABLET, ORALLY DISINTEGRATING ORAL EVERY 6 HOURS PRN
Status: DISCONTINUED | OUTPATIENT
Start: 2023-01-24 | End: 2023-01-24 | Stop reason: HOSPADM

## 2023-01-24 RX ORDER — NALOXONE HYDROCHLORIDE 0.4 MG/ML
0.4 INJECTION, SOLUTION INTRAMUSCULAR; INTRAVENOUS; SUBCUTANEOUS
Status: DISCONTINUED | OUTPATIENT
Start: 2023-01-24 | End: 2023-01-24 | Stop reason: HOSPADM

## 2023-01-24 RX ORDER — FENTANYL CITRATE 50 UG/ML
INJECTION, SOLUTION INTRAMUSCULAR; INTRAVENOUS PRN
Status: DISCONTINUED | OUTPATIENT
Start: 2023-01-24 | End: 2023-01-24 | Stop reason: HOSPADM

## 2023-01-24 RX ORDER — PROCHLORPERAZINE MALEATE 5 MG
5 TABLET ORAL EVERY 6 HOURS PRN
Status: DISCONTINUED | OUTPATIENT
Start: 2023-01-24 | End: 2023-01-24 | Stop reason: HOSPADM

## 2023-01-24 RX ORDER — FLUMAZENIL 0.1 MG/ML
0.2 INJECTION, SOLUTION INTRAVENOUS
Status: DISCONTINUED | OUTPATIENT
Start: 2023-01-24 | End: 2023-01-24 | Stop reason: HOSPADM

## 2023-01-24 RX ORDER — ONDANSETRON 2 MG/ML
4 INJECTION INTRAMUSCULAR; INTRAVENOUS EVERY 6 HOURS PRN
Status: DISCONTINUED | OUTPATIENT
Start: 2023-01-24 | End: 2023-01-24 | Stop reason: HOSPADM

## 2023-01-24 RX ORDER — ONDANSETRON 2 MG/ML
4 INJECTION INTRAMUSCULAR; INTRAVENOUS
Status: DISCONTINUED | OUTPATIENT
Start: 2023-01-24 | End: 2023-01-24 | Stop reason: HOSPADM

## 2023-01-24 RX ORDER — LIDOCAINE 40 MG/G
CREAM TOPICAL
Status: DISCONTINUED | OUTPATIENT
Start: 2023-01-24 | End: 2023-01-24 | Stop reason: HOSPADM

## 2023-01-24 ASSESSMENT — ACTIVITIES OF DAILY LIVING (ADL): ADLS_ACUITY_SCORE: 35

## 2023-01-24 NOTE — H&P
Pre-Endoscopy History and Physical   Pan Bills MRN# [id number]   YOB: 1950 Age: 72 year old   Date of Procedure: 1/24/2023   Primary care provider: Sheeba Alex   Type of Endoscopy: colonoscopy   Reason for Procedure: personal history of colon cancer   Type of Anesthesia Anticipated: Moderate Sedation   HPI:   Pan is a 72 year old male with a personal history of colon cancer.  He last had a colonoscopy in 2015 which was normal.  He denies BRBPR, abdominal pain, nausea/vomiting, changes in bowel habits or unintentional weight loss.    No Known Allergies   Prior to Admission Medications   Prescriptions Last Dose Informant Patient Reported? Taking?   aspirin (ASA) 81 MG chewable tablet Past Week  Yes Yes   Sig: Take 81 mg by mouth daily      Facility-Administered Medications: None      Patient Active Problem List   Diagnosis     Atrial fibrillation (H)     Mitral valve regurgitation      Past Medical History:   Diagnosis Date     Atrial fibrillation (H)      Cancer (H)     COLON     Mitral valve regurgitation     Mod-severe MR (2-3+) per 5/14 echo      Past Surgical History:   Procedure Laterality Date     COLONOSCOPY N/A 10/1/2015    Procedure: COLONOSCOPY;  Surgeon: Markus Moore MD;  Location:  GI     GI SURGERY      partial colon resection for CA     HERNIA REPAIR      (R) inguinal  hernia repair     HERNIORRHAPHY EPIGASTRIC N/A 7/6/2016    Procedure: HERNIORRHAPHY EPIGASTRIC;  Surgeon: Shorty Salomon MD;  Location: [facility]     HERNIORRHAPHY INGUINAL Right 7/6/2016    Procedure: HERNIORRHAPHY INGUINAL;  Surgeon: Shorty Salomon MD;  Location: [facility]     VASCULAR SURGERY      VARICOSE VEIN SURGERY BILAT      Social History     Tobacco Use     Smoking status: Some Days     Types: Cigars     Smokeless tobacco: Never     Tobacco comments:     pt smokes 1 cigar per week   Substance Use Topics     Alcohol use: No     Alcohol/week: 0.0 standard drinks      Family History  "  Problem Relation Age of Onset     Respiratory Mother      Cancer Father 65        Lung CA     Diabetes Maternal Grandmother      Cardiovascular Brother         Royal Vazquez      PHYSICAL EXAM:   BP (!) 129/96   Pulse 90   Resp 14   SpO2 97%  Estimated body mass index is 23.75 kg/m  as calculated from the following:    Height as of 12/6/18: 1.854 m (6' 1\").    Weight as of 12/6/18: 81.6 kg (180 lb).   RESP: lungs clear to auscultation - no rales, rhonchi or wheezes   CV: regular rates and rhythm   ASA Class 2 - Mild systemic disease    Assessment: 71 y/o gentleman with a personal history of colon cancer    Plan: Colonoscopy with moderate sedation.  Risks of the procedure were discussed including, but not limited to, bleeding, perforation and missed lesions.  Patient understands and is willing to proceed.    Ray Burgos MD ....................  1/24/2023   7:37 AM  Colon and Rectal Surgery Staff  [phone]    "

## 2023-01-25 LAB
PATH REPORT.COMMENTS IMP SPEC: NORMAL
PATH REPORT.COMMENTS IMP SPEC: NORMAL
PATH REPORT.FINAL DX SPEC: NORMAL
PATH REPORT.GROSS SPEC: NORMAL
PATH REPORT.MICROSCOPIC SPEC OTHER STN: NORMAL
PATH REPORT.RELEVANT HX SPEC: NORMAL
PHOTO IMAGE: NORMAL

## 2023-01-25 PROCEDURE — 88305 TISSUE EXAM BY PATHOLOGIST: CPT | Mod: 26 | Performed by: PATHOLOGY

## 2023-02-15 ENCOUNTER — OFFICE VISIT (OUTPATIENT)
Dept: CARDIOLOGY | Facility: CLINIC | Age: 73
End: 2023-02-15
Payer: COMMERCIAL

## 2023-02-15 VITALS
OXYGEN SATURATION: 98 % | WEIGHT: 184.2 LBS | SYSTOLIC BLOOD PRESSURE: 120 MMHG | BODY MASS INDEX: 24.41 KG/M2 | HEART RATE: 90 BPM | DIASTOLIC BLOOD PRESSURE: 84 MMHG | HEIGHT: 73 IN

## 2023-02-15 DIAGNOSIS — I34.0 NONRHEUMATIC MITRAL VALVE REGURGITATION: ICD-10-CM

## 2023-02-15 DIAGNOSIS — I48.20 CHRONIC ATRIAL FIBRILLATION (H): ICD-10-CM

## 2023-02-15 DIAGNOSIS — R00.2 PALPITATIONS: Primary | ICD-10-CM

## 2023-02-15 PROCEDURE — 93000 ELECTROCARDIOGRAM COMPLETE: CPT | Performed by: INTERNAL MEDICINE

## 2023-02-15 PROCEDURE — 99204 OFFICE O/P NEW MOD 45 MIN: CPT | Performed by: INTERNAL MEDICINE

## 2023-02-15 NOTE — PROGRESS NOTES
Service Date: 02/15/2023    REFERRING PHYSICIAN:  Dr. Sheeba Alex.    HISTORY OF PRESENT ILLNESS:  It is my pleasure to see your patient, Pan Bills, who is a patient of Dr. Austyn Calderon.  This is a patient who was diagnosed with asymptomatic atrial fibrillation in 2014.  At that time, the patient's CHADS-VASc score was 0.  He was placed on aspirin.  Looking at Dr. Calderon' note from 2014, electrical cardioversion was discussed with the patient including the use of anticoagulation, but at that time the patient chose not to proceed ahead with electrical cardioversion and the patient was treated conservatively.  The patient was placed on aspirin 81 mg for stroke prophylaxis.  He was not on any rate controlling medication at that time nor is he on it at this particular time.  The last time Dr. Calderon saw the patient was in 2018.  His CHADS-VASc score is 1, because he is 72 years of age.  However, he has no other factors which would add to his score.  He does not have heart failure.  He does not have reduced left ventricular systolic function.  He is not diabetic.  He has had no previous strokes.  He has no objective vascular disease, and he is male.  The patient appears to be a very active man.  He does a lot of trail running, and interestingly, when he is running, his heart rate is around the 120s.  He has one of these watches that can check his heart.  His EKG today confirmed that he is in atrial fibrillation and his ventricular rate is controlled at rest at 77 beats per minute.  No ischemic change is present.  There is a mention of an old anterior infarct, which is probably not present.    The patient also has a history of mitral regurgitation.  This has been there since the initial diagnosis.  The last echocardiogram on 12/20, however, showed that there had been some progression of the mitral regurgitation and now the mitral regurgitation is in the moderate to moderately severe range (2 to 3+).  The left  atrium, interestingly, was normal in size given that the patient had chronic atrial fibrillation, and also the right atrium was also normal in size.  Ejection fraction normal at 60%-65% with normal left ventricular size.  The patient still remains asymptomatic with atrial fibrillation.  His blood pressure is well controlled today at 120/84.    IMPRESSION:    1.  Chronic atrial fibrillation with a CHADS-VASc score of 1, on aspirin alone.  2.  Moderate to moderately severe mitral regurgitation on echocardiography in 2018 with no subsequent echos performed.  3.  He is not on any rate controlling medication, which may be appropriate given the fact that his heart rates even when he is trail running seems to reach a maximum in the 120s.  Heart rate is in the 70s here today at rest.    PLAN:    1.  We will obtain an echocardiogram to follow the mitral regurgitation to see if there has been any deterioration.  2.  I will have the patient wear a 24-hour Holter monitor on a day that he is not running to determine how good his heart rate control is.  3.  I did have a long discussion with him about the use of aspirin versus anticoagulants.  With a CHADS-VASc score of 1, either can be used, per the ACC guidelines.  One could certainly consider using anticoagulation at this stage, but there is no solid data on that.  Certainly, when he turns 75, however, he will need to be on an anticoagulant because his CHADS-VASc score will be 2 at that stage.  I will have the patient follow up with his usual cardiologist, which is Dr. Austyn Calderon, in about a month's time with the results of these tests.    It is my pleasure to be involved in the care of this very nice patient.  All of his questions were answered at the end of our visit, and he has been told to call me if he has any questions or any concerns.    Timoteo Rodarte MD, [facility]    cc:  Sheeba Alex MD  [facility]  [address] [place] [address], [address]  [place] [address]    Timoteo Olmedo MD, [facility]        D: 02/15/2023   T: 02/15/2023   MT: austyn    Name:     MARIFER GUTIERREZ  MRN:      [id number]        Account:      [id number]   :      1950           Service Date: 02/15/2023       Document: [id number]

## 2023-02-15 NOTE — LETTER
2/15/2023    Sheeba Alex MD  [address]  [facility] [address]    RE: Pan Bills       Dear Colleague,     I had the pleasure of seeing Pan Bills in the [facility].  HPI and Plan:   See dictation          Orders Placed This Encounter   Procedures     Follow-Up with Cardiology     EKG 12-lead complete w/read - Clinics (performed today)     Holter Monitor 24 hour Adult Pediatric     Echocardiogram Complete       No orders of the defined types were placed in this encounter.      There are no discontinued medications.      Encounter Diagnoses   Name Primary?     Palpitations Yes     Chronic atrial fibrillation (H)      Nonrheumatic mitral valve regurgitation        CURRENT MEDICATIONS:  Current Outpatient Medications   Medication Sig Dispense Refill     aspirin (ASA) 81 MG chewable tablet Take 81 mg by mouth daily         ALLERGIES   No Known Allergies    PAST MEDICAL HISTORY:  Past Medical History:   Diagnosis Date     Atrial fibrillation (H)      Cancer (H)     COLON     Mitral valve regurgitation     Mod-severe MR (2-3+) per 5/14 echo       PAST SURGICAL HISTORY:  Past Surgical History:   Procedure Laterality Date     COLONOSCOPY N/A 10/1/2015    Procedure: COLONOSCOPY;  Surgeon: Markus Moore MD;  Location:  GI     COLONOSCOPY N/A 1/24/2023    Procedure: COLONOSCOPY, FLEXIBLE, WITH LESION REMOVAL USING SNARE;  Surgeon: Ray Burgos MD;  Location:  GI     GI SURGERY      partial colon resection for CA     HERNIA REPAIR      (R) inguinal  hernia repair     HERNIORRHAPHY EPIGASTRIC N/A 7/6/2016    Procedure: HERNIORRHAPHY EPIGASTRIC;  Surgeon: Shorty Salomon MD;  Location: [facility]     HERNIORRHAPHY INGUINAL Right 7/6/2016    Procedure: HERNIORRHAPHY INGUINAL;  Surgeon: Shorty Salomon MD;  Location: [facility]     VASCULAR SURGERY      VARICOSE VEIN SURGERY BILAT       FAMILY HISTORY:  Family History   Problem Relation Age of Onset     Respiratory Mother   "    Cancer Father 65        Lung CA     Diabetes Maternal Grandmother      Cardiovascular Brother         Royal Vazquez       SOCIAL HISTORY:  Social History     Socioeconomic History     Marital status:      Spouse name: None     Number of children: None     Years of education: None     Highest education level: None   Tobacco Use     Smoking status: Some Days     Types: Cigars     Smokeless tobacco: Never     Tobacco comments:     pt smokes 1 cigar per week   Substance and Sexual Activity     Alcohol use: No     Alcohol/week: 0.0 standard drinks     Drug use: No   Other Topics Concern     Caffeine Concern Yes     Comment: 2-3 cups/day     Sleep Concern No     Stress Concern No     Weight Concern No     Special Diet No     Exercise Yes     Comment: running 1-2x/week x1.5 hours     Seat Belt Yes       Review of Systems:  Skin:          Eyes:         ENT:         Respiratory:          Cardiovascular:         Gastroenterology:        Genitourinary:         Musculoskeletal:         Neurologic:         Psychiatric:         Heme/Lymph/Imm:         Endocrine:           Physical Exam:  Vitals: /84   Pulse 90   Ht 1.854 m (6' 1\")   Wt 83.6 kg (184 lb 3.2 oz)   SpO2 98%   BMI 24.30 kg/m      Constitutional:  cooperative, alert and oriented, well developed, well nourished, in no acute distress        Skin:  warm and dry to the touch, no apparent skin lesions or masses noted          Head:  normocephalic, no masses or lesions        Eyes:  pupils equal and round        Lymph:No Cervical lymphadenopathy present     ENT:  no pallor or cyanosis, dentition good        Neck:  carotid pulses are full and equal bilaterally, JVP normal, no carotid bruit        Respiratory:  normal breath sounds, clear to auscultation, normal A-P diameter, normal symmetry, normal respiratory excursion, no use of accessory muscles         Cardiac:  (variable S1 and normal S2) irregular rhythm   no presence of murmur apical;radiation " to the axilla;late systolic murmur;grade 2        pulses full and equal, no bruits auscultated                                        GI:  abdomen soft, non-tender, BS normoactive, no mass, no HSM, no bruits        Extremities and Muscular Skeletal:  no deformities, clubbing, cyanosis, erythema observed stasis pigmentation   RLE edema;2+;pitting LLE edema;1+;pitting      Neurological:  no gross motor deficits;affect appropriate        Psych:  Alert and Oriented x 3        CC  No referring provider defined for this encounter.                Service Date: 02/15/2023    REFERRING PHYSICIAN:  Dr. Sheeba Alex.    HISTORY OF PRESENT ILLNESS:  It is my pleasure to see your patient, Pan Bills, who is a patient of Dr. Austyn Calderon.  This is a patient who was diagnosed with asymptomatic atrial fibrillation in 2014.  At that time, the patient's CHADS-VASc score was 0.  He was placed on aspirin.  Looking at Dr. Calderon' note from 2014, electrical cardioversion was discussed with the patient including the use of anticoagulation, but at that time the patient chose not to proceed ahead with electrical cardioversion and the patient was treated conservatively.  The patient was placed on aspirin 81 mg for stroke prophylaxis.  He was not on any rate controlling medication at that time nor is he on it at this particular time.  The last time Dr. Calderon saw the patient was in 2018.  His CHADS-VASc score is 1, because he is 72 years of age.  However, he has no other factors which would add to his score.  He does not have heart failure.  He does not have reduced left ventricular systolic function.  He is not diabetic.  He has had no previous strokes.  He has no objective vascular disease, and he is male.  The patient appears to be a very active man.  He does a lot of trail running, and interestingly, when he is running, his heart rate is around the 120s.  He has one of these watches that can check his heart.  His EKG today confirmed  that he is in atrial fibrillation and his ventricular rate is controlled at rest at 77 beats per minute.  No ischemic change is present.  There is a mention of an old anterior infarct, which is probably not present.    The patient also has a history of mitral regurgitation.  This has been there since the initial diagnosis.  The last echocardiogram on 12/20, however, showed that there had been some progression of the mitral regurgitation and now the mitral regurgitation is in the moderate to moderately severe range (2 to 3+).  The left atrium, interestingly, was normal in size given that the patient had chronic atrial fibrillation, and also the right atrium was also normal in size.  Ejection fraction normal at 60%-65% with normal left ventricular size.  The patient still remains asymptomatic with atrial fibrillation.  His blood pressure is well controlled today at 120/84.    IMPRESSION:    1.  Chronic atrial fibrillation with a CHADS-VASc score of 1, on aspirin alone.  2.  Moderate to moderately severe mitral regurgitation on echocardiography in 2018 with no subsequent echos performed.  3.  He is not on any rate controlling medication, which may be appropriate given the fact that his heart rates even when he is trail running seems to reach a maximum in the 120s.  Heart rate is in the 70s here today at rest.    PLAN:    1.  We will obtain an echocardiogram to follow the mitral regurgitation to see if there has been any deterioration.  2.  I will have the patient wear a 24-hour Holter monitor on a day that he is not running to determine how good his heart rate control is.  3.  I did have a long discussion with him about the use of aspirin versus anticoagulants.  With a CHADS-VASc score of 1, either can be used, per the ACC guidelines.  One could certainly consider using anticoagulation at this stage, but there is no solid data on that.  Certainly, when he turns 75, however, he will need to be on an anticoagulant  because his CHADS-VASc score will be 2 at that stage.  I will have the patient follow up with his usual cardiologist, which is Dr. Austyn Calderon, in about a month's time with the results of these tests.    It is my pleasure to be involved in the care of this very nice patient.  All of his questions were answered at the end of our visit, and he has been told to call me if he has any questions or any concerns.    Timoteo Rodarte MD, [facility]    cc:  Sheeba Alex MD  [facility]  [address] [place] [address], [address] [place] [address]    Timoteo Olmedo MD, [facility]        D: 02/15/2023   T: 02/15/2023   MT:     Name:     MARIFER GUTIERREZ  MRN:      [id number]        Account:      [id number]   :      1950           Service Date: 02/15/2023       Document: [id number]  Thank you for allowing me to participate in the care of your patient.      Sincerely,     Timoteo Rodarte MD, MD     [facility]  cc:   No referring provider defined for this encounter.

## 2023-02-15 NOTE — PROGRESS NOTES
HPI and Plan:   See dictation          Orders Placed This Encounter   Procedures     Follow-Up with Cardiology     EKG 12-lead complete w/read - Clinics (performed today)     Holter Monitor 24 hour Adult Pediatric     Echocardiogram Complete       No orders of the defined types were placed in this encounter.      There are no discontinued medications.      Encounter Diagnoses   Name Primary?     Palpitations Yes     Chronic atrial fibrillation (H)      Nonrheumatic mitral valve regurgitation        CURRENT MEDICATIONS:  Current Outpatient Medications   Medication Sig Dispense Refill     aspirin (ASA) 81 MG chewable tablet Take 81 mg by mouth daily         ALLERGIES   No Known Allergies    PAST MEDICAL HISTORY:  Past Medical History:   Diagnosis Date     Atrial fibrillation (H)      Cancer (H)     COLON     Mitral valve regurgitation     Mod-severe MR (2-3+) per 5/14 echo       PAST SURGICAL HISTORY:  Past Surgical History:   Procedure Laterality Date     COLONOSCOPY N/A 10/1/2015    Procedure: COLONOSCOPY;  Surgeon: Markus Moore MD;  Location:  GI     COLONOSCOPY N/A 1/24/2023    Procedure: COLONOSCOPY, FLEXIBLE, WITH LESION REMOVAL USING SNARE;  Surgeon: Ray Burgos MD;  Location:  GI     GI SURGERY      partial colon resection for CA     HERNIA REPAIR      (R) inguinal  hernia repair     HERNIORRHAPHY EPIGASTRIC N/A 7/6/2016    Procedure: HERNIORRHAPHY EPIGASTRIC;  Surgeon: Shorty Salomon MD;  Location: [facility]     HERNIORRHAPHY INGUINAL Right 7/6/2016    Procedure: HERNIORRHAPHY INGUINAL;  Surgeon: Shorty Salomon MD;  Location: [facility]     VASCULAR SURGERY      VARICOSE VEIN SURGERY BILAT       FAMILY HISTORY:  Family History   Problem Relation Age of Onset     Respiratory Mother      Cancer Father 65        Lung CA     Diabetes Maternal Grandmother      Cardiovascular Brother         Paige Parkinsons       SOCIAL HISTORY:  Social History     Socioeconomic History     Marital status:  "     Spouse name: None     Number of children: None     Years of education: None     Highest education level: None   Tobacco Use     Smoking status: Some Days     Types: Cigars     Smokeless tobacco: Never     Tobacco comments:     pt smokes 1 cigar per week   Substance and Sexual Activity     Alcohol use: No     Alcohol/week: 0.0 standard drinks     Drug use: No   Other Topics Concern     Caffeine Concern Yes     Comment: 2-3 cups/day     Sleep Concern No     Stress Concern No     Weight Concern No     Special Diet No     Exercise Yes     Comment: running 1-2x/week x1.5 hours     Seat Belt Yes       Review of Systems:  Skin:          Eyes:         ENT:         Respiratory:          Cardiovascular:         Gastroenterology:        Genitourinary:         Musculoskeletal:         Neurologic:         Psychiatric:         Heme/Lymph/Imm:         Endocrine:           Physical Exam:  Vitals: /84   Pulse 90   Ht 1.854 m (6' 1\")   Wt 83.6 kg (184 lb 3.2 oz)   SpO2 98%   BMI 24.30 kg/m      Constitutional:  cooperative, alert and oriented, well developed, well nourished, in no acute distress        Skin:  warm and dry to the touch, no apparent skin lesions or masses noted          Head:  normocephalic, no masses or lesions        Eyes:  pupils equal and round        Lymph:No Cervical lymphadenopathy present     ENT:  no pallor or cyanosis, dentition good        Neck:  carotid pulses are full and equal bilaterally, JVP normal, no carotid bruit        Respiratory:  normal breath sounds, clear to auscultation, normal A-P diameter, normal symmetry, normal respiratory excursion, no use of accessory muscles         Cardiac:  (variable S1 and normal S2) irregular rhythm   no presence of murmur apical;radiation to the axilla;late systolic murmur;grade 2        pulses full and equal, no bruits auscultated                                        GI:  abdomen soft, non-tender, BS normoactive, no mass, no HSM, no bruits "        Extremities and Muscular Skeletal:  no deformities, clubbing, cyanosis, erythema observed stasis pigmentation   RLE edema;2+;pitting LLE edema;1+;pitting      Neurological:  no gross motor deficits;affect appropriate        Psych:  Alert and Oriented x 3        CC  No referring provider defined for this encounter.

## 2023-02-17 NOTE — ADDENDUM NOTE
Encounter addended by: Liseth Hilton, OT on: 2/17/2023 2:27 PM   Actions taken: Clinical Note Signed, Flowsheet accepted

## 2023-02-17 NOTE — PROGRESS NOTES
[facility]        OUTPATIENT OCCUPATIONAL THERAPY EDEMA EVALUATION  PLAN OF TREATMENT FOR OUTPATIENT REHABILITATION  (COMPLETE FOR INITIAL CLAIMS ONLY)  Patient's Last Name, First Name, Pan Gamez                           Provider s Name:   [facility] Medical Record No.  [id number]     Start of Care Date:  01/23/23   Onset Date:  12/29/22 (Pt reports several year history of BLE swelling, worse on R, exacerbated by injury 3-4 months ago, bumping into deck.)   Type:  OT   Medical Diagnosis:  Lymphedema   Therapy Diagnosis:  Lymphedema, BLE Visits from SOC:  1                                     __________________________________________________________________________________   Plan of Treatment/Functional Goals:    Manual lymph drainage, Gradient compression bandaging, Fit for compression garment, Exercises, Precautions to prevent infection / exacerbation, Education, Skin care / precautions, Home management program development        GOALS  1. Goal description: Pt to be Indpedendent with self GCB to decrease limb volume, faciliating ambulation with greater ease.       Target date: 04/23/23  2. Goal description: Pt to be independent with self MLD, HEP to stimulate lymphatic system,enhance lymph flow to decongest limb, contribute to limb decongestion       Target date: 04/23/23  3. Goal description: Pt to verbalize understanding of lymphedema as chronic condition,  stages, and increased risk of infection, signs and symptoms of infection, need for medical care if they occur, and role of skin cafre and hygiene in reducing risk of infection.       Target date: 04/23/23  4. Goal description: Pt to be fit with compression garment and don/doff independently for ogoing management of lymphedema       Target date: 04/23/23  5.            6.               7.              8.              Treatment Frequency: 3x/week   Treatment duration: 3 weeks    Liseth Hilton OT                                    I CERTIFY THE NEED FOR THESE SERVICES FURNISHED UNDER        THIS PLAN OF TREATMENT AND WHILE UNDER MY CARE     (Physician co-signature of this document indicates review and certification of the therapy plan).               DUTCH Shaver    Certification date from: 01/23/23       Certification date to: 04/23/23           Referring physician: Ally Mccray MD   Initial Assessment  See Epic Evaluation- Start of care: 01/23/23

## 2023-02-17 NOTE — PROGRESS NOTES
01/23/23 0800   Quick Adds   Quick Adds Certification   Rehab Discipline   Discipline OT   Type of Visit   Type of visit Initial Edema Evaluation   General Information   Start of care 01/23/23   Referring physician Ally Mccray MD   Orders Evaluate and treat as indicated   Order date 12/29/22   Medical diagnosis Lymphedema   Onset of illness / date of surgery 12/29/22   Edema onset 12/29/22  (Pt reports several year history of BLE swelling, worse on R, exacerbated by injury 3-4 months ago, bumping into deck.)   Affected body parts LLE;RLE   Edema etiology Chronic Venous Insufficiency;Trauma   Pertinent history of current problem (PT: include personal factors and/or comorbidities that impact the POC; OT: include additional occupational profile info) Pt reports chronic swelling for several years, worse on R, elevation provides no relief.. Wears compression stockings all the time, with no effect. PMH included chronic a fib, mitral valve insufficiency, colon cancer.  Pt runs 3 x week, up to 20 miles at at time.After ultrasound shows no thrombus or stenosis, pt was referred to lymphedema clinic.   Surgical / medical history reviewed Yes   Edema special tests Ultrasound  (Ultrasound of iliac vein, vena cava with no evidence of thrombus or stenosis)   Prior level of functional mobility Independent   Prior treatment Elevation;Compression garments   Community support Family / friend caregiver   Patient role / employment history Retired   Psychosocial concerns   (None)   Living environment [place] [facility]   Fall Risk Screen   Fall screen completed by OT   Have you fallen 2 or more times in the past year? No   Have you fallen and had an injury in the past year? No   Is patient a fall risk? No   Abuse Screen (yes response referral indicated)   Feels Unsafe at Home or Work/School no   Feels Threatened by Someone no   Does Anyone Try to Keep You From Having Contact with Others or Doing Things Outside Your Home? no    Physical Signs of Abuse Present no   System Outcome Measures   Lymphedema Life Impact Scale (score range 0-72). A higher score indicates greater impairment. 24   Subjective Report   Patient report of symptoms Leg is heavy, tight, sometimes painful, limits ability to run   Precautions   Precautions   (None noted)   Patient / Family Goals   Patient / family goals statement To get swelling down, especially in RLE   Pain   Patient currently in pain No   Vitals Signs   Weight 180   BMI 23.75   Cognitive Status   Orientation Orientation to person, place and time   Edema Exam / Assessment   Skin condition Pitting;Venous distention;Dryness   Skin condition comments varicosities noted, also dryness   Pitting 2+;3+   Pitting location 3+ pitting on R below knee into dorsal foot, 2+ pitting on L below knee into dorsal foot   Capillary refill Symmetrical   Stemmer sign Positive   Ulceration comments Pt reports 2 healed wounds on RLE.   Girth Measurements   Girth Measurements Refer to separate girth measurement flowsheet   Volume LE   Right LE (mL) 3233   Left LE (mL) 2419   LE volume comparison RLE volume greater than LLE volume   Range of Motion   ROM Other   ROM comments Decreased ankle dorsi and plantar flexion due to swelling in RLE.   Strength   Strength No deficits were identified   Posture   Posture Normal   Activities of Daily Living   Activities of Daily Living Independent   Gait / Locomotion   Gait / Locomotion Independent   Coordination   Coordination Gross motor coordination appropriate   Muscle Tone   Muscle tone No deficits were identified   Planned Edema Interventions   Planned edema interventions Manual lymph drainage;Gradient compression bandaging;Fit for compression garment;Exercises;Precautions to prevent infection / exacerbation;Education;Skin care / precautions;Home management program development   Clinical Impression   Criteria for skilled therapeutic intervention met Yes   Therapy diagnosis Lymphedema,  BLE   Influenced by the following impairments / conditions Stage 2;Phlebolymphedema   Assessment of Occupational Performance 1-3 Performance Deficits   Identified Performance Deficits BLe swelling, R  L, impacting functional mobility, running, risk of infection   Clinical Decision Making (Complexity) Low complexity   Treatment Frequency 3x/week   Treatment duration 3 weeks   Patient / family and/or staff in agreement with plan of care Yes   Risks and benefits of therapy have been explained Yes   Clinical impression comments 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 Pt is good candidate for CDT to decongest limbs, teach self management of chronic lymphedema.   Education Assessment   Preferred learning style Listening;Reading;Demonstration   Barriers to learning No barriers   Goals   Edema Eval Goals 1;2;3;4   Goal 1   Goal identifier 1   Goal description Pt to be Indpedendent with self GCB to decrease limb volume, faciliating ambulation with greater ease.   Target date 04/23/23   Goal 2   Goal identifier 2   Goal description Pt to be independent with self MLD, HEP to stimulate lymphatic system,enhance lymph flow to decongest limb, contribute to limb decongestion   Target date 04/23/23   Goal 3   Goal identifier 3   Goal description Pt to verbalize understanding of lymphedema as chronic condition,  stages, and increased risk of infection, signs and symptoms of infection, need for medical care if they occur, and role of skin cafre and hygiene in reducing risk of infection.   Target date 04/23/23   Goal 4   Goal identifier 4   Goal description Pt to be fit with compression garment and don/doff independently for ogoing management of lymphedema   Target date 04/23/23   Total Evaluation Time   OT Eval, Low Complexity  Minutes (85491) 20   Certification   Certification date from 01/23/23   Certification date to 04/23/23   Medical Diagnosis Lymphedema   Certification I certify the need for these services furnished under this plan of treatment and while under my care.  (Physician co-signature of this document indicates review and certification of the therapy plan).

## 2023-02-20 ENCOUNTER — HOSPITAL ENCOUNTER (OUTPATIENT)
Dept: OCCUPATIONAL THERAPY | Facility: CLINIC | Age: 73
Discharge: HOME | End: 2023-02-20
Payer: COMMERCIAL

## 2023-02-20 ENCOUNTER — HOSPITAL ENCOUNTER (OUTPATIENT)
Dept: CARDIOLOGY | Facility: CLINIC | Age: 73
Discharge: HOME | End: 2023-02-20
Attending: INTERNAL MEDICINE | Admitting: INTERNAL MEDICINE
Payer: COMMERCIAL

## 2023-02-20 DIAGNOSIS — I89.0 LYMPHEDEMA: Primary | ICD-10-CM

## 2023-02-20 DIAGNOSIS — I34.0 NONRHEUMATIC MITRAL VALVE REGURGITATION: ICD-10-CM

## 2023-02-20 LAB — LVEF ECHO: NORMAL

## 2023-02-20 PROCEDURE — 93306 TTE W/DOPPLER COMPLETE: CPT

## 2023-02-20 PROCEDURE — 93306 TTE W/DOPPLER COMPLETE: CPT | Mod: 26 | Performed by: INTERNAL MEDICINE

## 2023-02-20 PROCEDURE — 97140 MANUAL THERAPY 1/> REGIONS: CPT | Mod: GO | Performed by: OCCUPATIONAL THERAPIST

## 2023-02-21 ENCOUNTER — HOSPITAL ENCOUNTER (OUTPATIENT)
Dept: OCCUPATIONAL THERAPY | Facility: CLINIC | Age: 73
Discharge: HOME | End: 2023-02-21
Payer: COMMERCIAL

## 2023-02-21 PROCEDURE — 97140 MANUAL THERAPY 1/> REGIONS: CPT | Mod: GO | Performed by: OCCUPATIONAL THERAPIST

## 2023-02-24 ENCOUNTER — HOSPITAL ENCOUNTER (OUTPATIENT)
Dept: OCCUPATIONAL THERAPY | Facility: CLINIC | Age: 73
Discharge: HOME | End: 2023-02-24
Payer: COMMERCIAL

## 2023-02-24 DIAGNOSIS — I89.0 LYMPHEDEMA: Primary | ICD-10-CM

## 2023-02-24 PROCEDURE — 97140 MANUAL THERAPY 1/> REGIONS: CPT | Mod: GO | Performed by: OCCUPATIONAL THERAPIST

## 2023-02-27 ENCOUNTER — HOSPITAL ENCOUNTER (OUTPATIENT)
Dept: CARDIOLOGY | Facility: CLINIC | Age: 73
Discharge: HOME | End: 2023-02-27
Attending: INTERNAL MEDICINE | Admitting: INTERNAL MEDICINE
Payer: COMMERCIAL

## 2023-02-27 DIAGNOSIS — I48.20 CHRONIC ATRIAL FIBRILLATION (H): ICD-10-CM

## 2023-02-27 PROCEDURE — 93225 XTRNL ECG REC<48 HRS REC: CPT

## 2023-02-27 PROCEDURE — 93227 XTRNL ECG REC<48 HR R&I: CPT | Performed by: INTERNAL MEDICINE

## 2023-02-28 ENCOUNTER — HOSPITAL ENCOUNTER (OUTPATIENT)
Dept: OCCUPATIONAL THERAPY | Facility: CLINIC | Age: 73
Discharge: HOME | End: 2023-02-28
Payer: COMMERCIAL

## 2023-02-28 DIAGNOSIS — I89.0 LYMPHEDEMA: Primary | ICD-10-CM

## 2023-02-28 PROCEDURE — 97140 MANUAL THERAPY 1/> REGIONS: CPT | Mod: GO | Performed by: OCCUPATIONAL THERAPIST

## 2023-03-01 ENCOUNTER — TELEPHONE (OUTPATIENT)
Dept: CARDIOLOGY | Facility: CLINIC | Age: 73
End: 2023-03-01
Payer: COMMERCIAL

## 2023-03-01 DIAGNOSIS — Z53.9 ERRONEOUS ENCOUNTER--DISREGARD: Primary | ICD-10-CM

## 2023-03-02 ENCOUNTER — HOSPITAL ENCOUNTER (OUTPATIENT)
Dept: OCCUPATIONAL THERAPY | Facility: CLINIC | Age: 73
Discharge: HOME | End: 2023-03-02
Payer: COMMERCIAL

## 2023-03-02 PROCEDURE — 97140 MANUAL THERAPY 1/> REGIONS: CPT | Mod: GO | Performed by: OCCUPATIONAL THERAPIST

## 2023-03-03 ENCOUNTER — HOSPITAL ENCOUNTER (OUTPATIENT)
Dept: OCCUPATIONAL THERAPY | Facility: CLINIC | Age: 73
Discharge: HOME | End: 2023-03-03
Payer: COMMERCIAL

## 2023-03-03 DIAGNOSIS — I89.0 LYMPHEDEMA: Primary | ICD-10-CM

## 2023-03-03 PROCEDURE — 97140 MANUAL THERAPY 1/> REGIONS: CPT | Mod: GO | Performed by: OCCUPATIONAL THERAPIST

## 2023-03-03 NOTE — PROGRESS NOTES
[facility]        OUTPATIENT OCCUPATIONAL THERAPY EDEMA EVALUATION  PLAN OF TREATMENT FOR OUTPATIENT REHABILITATION  (COMPLETE FOR INITIAL CLAIMS ONLY)  Patient's Last Name, First Name, Pan Gamez                                                                                                            [facility]        OUTPATIENT OCCUPATIONAL THERAPY EDEMA EVALUATION  PLAN OF TREATMENT FOR OUTPATIENT REHABILITATION  (COMPLETE FOR INITIAL CLAIMS ONLY)  Patient's Last Name, First Name, ARUNAmeeKANIKAPan Bowden                           Provider s Name:   [facility] Medical Record No.  [id number]     Start of Care Date:  01/23/23   Onset Date:  12/29/22 (Pt reports several year history of BLE swelling, worse on R, exacerbated by injury 3-4 months ago, bumping into deck.)   Type:  OT   Medical Diagnosis:  Lymphedema   Therapy Diagnosis:  Lymphedema, BLE Visits from SOC:  1                                     __________________________________________________________________________________   Plan of Treatment/Functional Goals:    Manual lymph drainage, Gradient compression bandaging, Fit for compression garment, Exercises, Precautions to prevent infection / exacerbation, Education, Skin care / precautions, Home management program development        GOALS  1. Goal description: Pt to be Indpedendent with self GCB to decrease limb volume, faciliating ambulation with greater ease.       Target date: 04/23/23  2. Goal description: Pt to be independent with self MLD, HEP to stimulate lymphatic system,enhance lymph flow to decongest limb, contribute to limb decongestion       Target date: 04/23/23  3. Goal description: Pt to verbalize understanding of lymphedema as chronic condition,  stages, and increased risk of infection, signs and symptoms of  infection, need for medical care if they occur, and role of skin cafre and hygiene in reducing risk of infection.       Target date: 04/23/23  4. Goal description: Pt to be fit with compression garment and don/doff independently for ogoing management of lymphedema       Target date: 04/23/23                Treatment Frequency: 3x/week   Treatment duration: 3 weeks    Liseth Hilton OT                                    I CERTIFY THE NEED FOR THESE SERVICES FURNISHED UNDER        THIS PLAN OF TREATMENT AND WHILE UNDER MY CARE     (Physician co-signature of this document indicates review and certification of the therapy plan).               GARRETT Mccray MD, FACS    Certification date from: 01/23/23       Certification date to: 04/23/23           Referring physician: Ally Mccray MD   Initial Assessment  See Epic Evaluation- Start of care: 01/23/23                                        Provider s Name:   [facility] Medical Record No.  [id number]     Start of Care Date:  01/23/23   Onset Date:  12/29/22 (Pt reports several year history of BLE swelling, worse on R, exacerbated by injury 3-4 months ago, bumping into deck.)   Type:  OT   Medical Diagnosis:  Lymphedema   Therapy Diagnosis:  Lymphedema, BLE Visits from SOC:  1                                     __________________________________________________________________________________   Plan of Treatment/Functional Goals:    Manual lymph drainage, Gradient compression bandaging, Fit for compression garment, Exercises, Precautions to prevent infection / exacerbation, Education, Skin care / precautions, Home management program development        GOALS  1. Goal description: Pt to be Indpedendent with self GCB to decrease limb volume, faciliating ambulation with greater ease.       Target date: 04/23/23  2. Goal description: Pt to be independent with self MLD, HEP to stimulate lymphatic system,enhance lymph flow to decongest limb,  contribute to limb decongestion       Target date: 04/23/23  3. Goal description: Pt to verbalize understanding of lymphedema as chronic condition,  stages, and increased risk of infection, signs and symptoms of infection, need for medical care if they occur, and role of skin cafre and hygiene in reducing risk of infection.       Target date: 04/23/23  4. Goal description: Pt to be fit with compression garment and don/doff independently for ogoing management of lymphedema       Target date: 04/23/23  5.            6.               7.             8.              Treatment Frequency: 3x/week   Treatment duration: 3 weeks    Liseth Hilton OT                                    I CERTIFY THE NEED FOR THESE SERVICES FURNISHED UNDER        THIS PLAN OF TREATMENT AND WHILE UNDER MY CARE     (Physician co-signature of this document indicates review and certification of the therapy plan).                   Certification date from: 01/23/23       Certification date to: 04/23/23           Referring physician: Ally Mccray MD   Initial Assessment  See Epic Evaluation- Start of care: 01/23/23

## 2023-03-06 NOTE — PROGRESS NOTES
[facility]    Outpatient Occupational Therapy Discharge Note  Patient: Pan Bills  : 1950    Beginning/End Dates of Reporting Period:  23* to 3/3/23    Referring Provider: Dr. Ally Mccray    Therapy Diagnosis: [id number]    Client Self Report: Dorsal feet much improved since yesterdays application of GCB.Goals:   Goal Identifier 1   Goal Description Pt to be Indpedendent with self GCB to decrease limb volume, faciliating ambulation with greater ease.   Target Date 23   Date Met  23   Progress (detail required for progress note):       Goal Identifier 2   Goal Description Pt to be independent with self MLD, HEP to stimulate lymphatic system,enhance lymph flow to decongest limb, contribute to limb decongestion   Target Date 23   Date Met  23   Progress (detail required for progress note):       Goal Identifier 3   Goal Description Pt to verbalize understanding of lymphedema as chronic condition,  stages, and increased risk of infection, signs and symptoms of infection, need for medical care if they occur, and role of skin cafre and hygiene in reducing risk of infection.   Target Date 23   Date Met  23   Progress (detail required for progress note):       Goal Identifier 4   Goal Description Pt to be fit with compression garment and don/doff independently for ogoing management of lymphedema   Target Date 23   Date Met   (goal discontinued)   Progress (detail required for progress note):                 Plan:  Discharge from therapy.    Discharge: yes    Reason for Discharge: Patient has met all goals.        Discharge Plan: Patient to continue home program.

## 2023-03-31 ENCOUNTER — OFFICE VISIT (OUTPATIENT)
Dept: CARDIOLOGY | Facility: CLINIC | Age: 73
End: 2023-03-31
Attending: INTERNAL MEDICINE
Payer: COMMERCIAL

## 2023-03-31 VITALS
HEIGHT: 73 IN | HEART RATE: 80 BPM | OXYGEN SATURATION: 97 % | DIASTOLIC BLOOD PRESSURE: 82 MMHG | BODY MASS INDEX: 24.08 KG/M2 | SYSTOLIC BLOOD PRESSURE: 118 MMHG | WEIGHT: 181.7 LBS

## 2023-03-31 DIAGNOSIS — I34.0 NONRHEUMATIC MITRAL VALVE REGURGITATION: ICD-10-CM

## 2023-03-31 DIAGNOSIS — R00.2 PALPITATIONS: ICD-10-CM

## 2023-03-31 DIAGNOSIS — I48.20 CHRONIC ATRIAL FIBRILLATION (H): ICD-10-CM

## 2023-03-31 PROCEDURE — 99213 OFFICE O/P EST LOW 20 MIN: CPT | Performed by: INTERNAL MEDICINE

## 2023-03-31 RX ORDER — METOPROLOL SUCCINATE 25 MG/1
25 TABLET, EXTENDED RELEASE ORAL DAILY
Qty: 90 TABLET | Refills: 3 | Status: SHIPPED | OUTPATIENT
Start: 2023-03-31 | End: 2023-06-23

## 2023-03-31 NOTE — PROGRESS NOTES
Service Date: 03/31/2023    REFERRING PHYSICIAN:  Dr. Sheeba Alex.    HISTORY OF PRESENT ILLNESS:  It is my pleasure to see your patient, Pan Bills, who has been followed for many years by Dr. Panchito Calderon.  I saw him most recently on 02/15 of this year.  This is a patient with chronic atrial fibrillation, who has been treated with aspirin but has no rate control medication, and I refer you to my dictation on 02/15.  We performed essentially 2 investigations.  One was a Holter monitor to determine what the patient's ambient heart rate is off of an AV neo blocking agent, and secondly to determine chamber size, left ventricular systolic function and also to assess his mitral valve, which previously was felt to be in the moderate to moderately severe range (2 to 3+).  If you remember, he had not had an echocardiogram prior to my visit in February since 2018.  The patient now returns with the results of our investigations.  Firstly, the echocardiogram shows that his degree of mitral regurgitation is mild, but the atria have now enlarged.  As I mentioned previously to you, it is surprising that given the patient's long history of atrial fibrillation that the echo in 2018 did not show any atrial enlargement, which is very unusual, but now the both atria are moderately enlarged, which is somewhat predictable.  The degree of mitral regurgitation, however, his moderate, which is slightly less than it has been in the past.  His left ventricular systolic function was in the normal range with an EF of 65%-70%.  No other significant valvular heart disease was noted.  Pulmonary pressures were normal.    The Holter monitor, however, did show that this patient's ventricular rate control is suboptimal.  The average heart rate is 93 beats per minute, but without exercise he achieved a heart rate of 166 beats per minute at 9:15 a.m.  He was not jogging or doing any other exercise at that time.  His longest pause was completely  acceptable at 2.19 seconds.  As you know, the patient's CHADS-VASc score is 1 and this indicates that the patient can either use aspirin or can use anticoagulation for stroke prophylaxis.  Given that his atria are moderately enlarged and that he will be 73 in approximately 7 months' time, one would tend to veer more towards using an anticoagulant rather than aspirin.  There is no hard and fast rules on this, but as people get older the risk of stroke gets higher.  Finally, we also discussed the possibility of even trying to get him back into normal rhythm.  That will be more empiric.  Given that he is entirely asymptomatic and also given the fact that getting back into sinus rhythm will not typically improve survival, that is certainly a subjective judgment call.  I think any patient in atrial fibrillation should be given the chance to be in sinus rhythm at least once, and I did discuss that with him, but I did also tell him that it probably would not improve his survival and it certainly is not going to improve his symptoms because he is entirely asymptomatic.    IMPRESSION:    1.  Ventricular rate is not optimally controlled as described above.  Average heart rate is 93 beats per minute and he can achieve a heart rate of 166 beats per minute and is on no AV neo blocking agent.  2.  Moderate mitral regurgitation, which is slightly improved from previously.  3.  Atria have now enlarged.  In 2018 both atria were normal in size.  Now they are moderately enlarged.  4.  CHADS-VASc score of 1 and, as I described above in detail, could either be on aspirin or anticoagulation, but when he is 75 he must be on an anticoagulant.  Given that his atria are enlarged and he is older, one could consider the use of apixaban at this stage.    PLAN:  1.  The one thing that he did commit to was to starting a beta blocker, so we will start him on metoprolol succinate 25 mg per day, which he should take in the morning time.  I have  advised him about dizziness, lightheadedness, fainting and near fainting, which would indicate either hypotension and/or slow heart rates.  The lateral would be less likely.  His blood pressure is around 118 now, so lowering his blood pressure could occur with the metoprolol medication.  2.  He would like to discuss with his wife the use of anticoagulation and possibly electrical cardioversion at this time.  I will see the patient back again in 4 months' time, but should he get any side effects from the metoprolol, he is to contact us immediately.  He is aware that one of the major complications associated with atrial fibrillation is stroke and that he is less likely to have stroke being on an anticoagulant, but with a CHADS-VASc score of 1 the jury is somewhat out, but as I mentioned, with larger atria now and that he is getting older, that is something I personally would recommend.  Again, he will discuss that.    It is my pleasure to be involved in the care of this nice patient.  We will see him again in 4 months' time.    Timoteo Rodarte MD, FACC    cc:  Sheeba Alex MD  [facility]  [address] [place] [address], [address] [place] [address]    Timoteo Olmedo MD, FACC        D: 2023   T: 2023   MT: austyn    Name:     MARIFER GUTIERREZ  MRN:      [id number]        Account:      [id number]   :      1950           Service Date: 2023       Document: [id number]

## 2023-03-31 NOTE — LETTER
3/31/2023    Sheeba Alex MD  [address]  [facility] [address]    RE: Pan Bills       Dear Colleague,     I had the pleasure of seeing Pan Bills in the [facility].  HPI and Plan:   See dictation        Orders Placed This Encounter   Procedures    Follow-Up with Cardiology    EKG 12-lead, tracing only       Orders Placed This Encounter   Medications    metoprolol succinate ER (TOPROL XL) 25 MG 24 hr tablet     Sig: Take 1 tablet (25 mg) by mouth daily     Dispense:  90 tablet     Refill:  3       There are no discontinued medications.      Encounter Diagnoses   Name Primary?    Palpitations     Chronic atrial fibrillation (H)     Nonrheumatic mitral valve regurgitation        CURRENT MEDICATIONS:  Current Outpatient Medications   Medication Sig Dispense Refill    aspirin (ASA) 81 MG chewable tablet Take 81 mg by mouth daily      metoprolol succinate ER (TOPROL XL) 25 MG 24 hr tablet Take 1 tablet (25 mg) by mouth daily 90 tablet 3       ALLERGIES   No Known Allergies    PAST MEDICAL HISTORY:  Past Medical History:   Diagnosis Date    Atrial fibrillation (H)     Cancer (H)     COLON    Mitral valve regurgitation     Mod-severe MR (2-3+) per 5/14 echo       PAST SURGICAL HISTORY:  Past Surgical History:   Procedure Laterality Date    COLONOSCOPY N/A 10/1/2015    Procedure: COLONOSCOPY;  Surgeon: Markus Moore MD;  Location:  GI    COLONOSCOPY N/A 1/24/2023    Procedure: COLONOSCOPY, FLEXIBLE, WITH LESION REMOVAL USING SNARE;  Surgeon: Ray Burgos MD;  Location:  GI    GI SURGERY      partial colon resection for CA    HERNIA REPAIR      (R) inguinal  hernia repair    HERNIORRHAPHY EPIGASTRIC N/A 7/6/2016    Procedure: HERNIORRHAPHY EPIGASTRIC;  Surgeon: Shorty Salomon MD;  Location: [facility]    HERNIORRHAPHY INGUINAL Right 7/6/2016    Procedure: HERNIORRHAPHY INGUINAL;  Surgeon: Shorty Salomon MD;  Location: [facility]    VASCULAR SURGERY      VARICOSE  "VEIN SURGERY BILAT       FAMILY HISTORY:  Family History   Problem Relation Age of Onset    Respiratory Mother     Cancer Father 65        Lung CA    Diabetes Maternal Grandmother     Cardiovascular Brother         Royal Vazquez       SOCIAL HISTORY:  Social History     Socioeconomic History    Marital status:      Spouse name: None    Number of children: None    Years of education: None    Highest education level: None   Tobacco Use    Smoking status: Some Days     Types: Cigars    Smokeless tobacco: Never    Tobacco comments:     pt smokes 1 cigar per week   Substance and Sexual Activity    Alcohol use: No     Alcohol/week: 0.0 standard drinks    Drug use: No   Other Topics Concern    Caffeine Concern Yes     Comment: 2-3 cups/day    Sleep Concern No    Stress Concern No    Weight Concern No    Special Diet No    Exercise Yes     Comment: running 1-2x/week x1.5 hours    Seat Belt Yes       Review of Systems:  Skin:          Eyes:         ENT:         Respiratory:          Cardiovascular:         Gastroenterology:        Genitourinary:         Musculoskeletal:         Neurologic:         Psychiatric:         Heme/Lymph/Imm:         Endocrine:           Physical Exam:  Vitals: /82   Pulse 80   Ht 1.854 m (6' 1\")   Wt 82.4 kg (181 lb 11.2 oz)   SpO2 97%   BMI 23.97 kg/m      Constitutional:           Skin:             Head:           Eyes:           Lymph:      ENT:           Neck:           Respiratory:            Cardiac:                                                           GI:           Extremities and Muscular Skeletal:                 Neurological:           Psych:           CC  Timoteo Rodarte MD  [address]  [place] [id number]                Service Date: 03/31/2023    REFERRING PHYSICIAN:  Dr. Sheeba Alex.    HISTORY OF PRESENT ILLNESS:  It is my pleasure to see your patient, Pan Bills, who has been followed for many years by Dr. Panchito Calderon.  I saw him most " recently on 02/15 of this year.  This is a patient with chronic atrial fibrillation, who has been treated with aspirin but has no rate control medication, and I refer you to my dictation on 02/15.  We performed essentially 2 investigations.  One was a Holter monitor to determine what the patient's ambient heart rate is off of an AV neo blocking agent, and secondly to determine chamber size, left ventricular systolic function and also to assess his mitral valve, which previously was felt to be in the moderate to moderately severe range (2 to 3+).  If you remember, he had not had an echocardiogram prior to my visit in February since 2018.  The patient now returns with the results of our investigations.  Firstly, the echocardiogram shows that his degree of mitral regurgitation is mild, but the atria have now enlarged.  As I mentioned previously to you, it is surprising that given the patient's long history of atrial fibrillation that the echo in 2018 did not show any atrial enlargement, which is very unusual, but now the both atria are moderately enlarged, which is somewhat predictable.  The degree of mitral regurgitation, however, his moderate, which is slightly less than it has been in the past.  His left ventricular systolic function was in the normal range with an EF of 65%-70%.  No other significant valvular heart disease was noted.  Pulmonary pressures were normal.    The Holter monitor, however, did show that this patient's ventricular rate control is suboptimal.  The average heart rate is 93 beats per minute, but without exercise he achieved a heart rate of 166 beats per minute at 9:15 a.m.  He was not jogging or doing any other exercise at that time.  His longest pause was completely acceptable at 2.19 seconds.  As you know, the patient's CHADS-VASc score is 1 and this indicates that the patient can either use aspirin or can use anticoagulation for stroke prophylaxis.  Given that his atria are moderately  enlarged and that he will be 73 in approximately 7 months' time, one would tend to veer more towards using an anticoagulant rather than aspirin.  There is no hard and fast rules on this, but as people get older the risk of stroke gets higher.  Finally, we also discussed the possibility of even trying to get him back into normal rhythm.  That will be more empiric.  Given that he is entirely asymptomatic and also given the fact that getting back into sinus rhythm will not typically improve survival, that is certainly a subjective judgment call.  I think any patient in atrial fibrillation should be given the chance to be in sinus rhythm at least once, and I did discuss that with him, but I did also tell him that it probably would not improve his survival and it certainly is not going to improve his symptoms because he is entirely asymptomatic.    IMPRESSION:    1.  Ventricular rate is not optimally controlled as described above.  Average heart rate is 93 beats per minute and he can achieve a heart rate of 166 beats per minute and is on no AV neo blocking agent.  2.  Moderate mitral regurgitation, which is slightly improved from previously.  3.  Atria have now enlarged.  In 2018 both atria were normal in size.  Now they are moderately enlarged.  4.  CHADS-VASc score of 1 and, as I described above in detail, could either be on aspirin or anticoagulation, but when he is 75 he must be on an anticoagulant.  Given that his atria are enlarged and he is older, one could consider the use of apixaban at this stage.    PLAN:  1.  The one thing that he did commit to was to starting a beta blocker, so we will start him on metoprolol succinate 25 mg per day, which he should take in the morning time.  I have advised him about dizziness, lightheadedness, fainting and near fainting, which would indicate either hypotension and/or slow heart rates.  The lateral would be less likely.  His blood pressure is around 118 now, so lowering  his blood pressure could occur with the metoprolol medication.  2.  He would like to discuss with his wife the use of anticoagulation and possibly electrical cardioversion at this time.  I will see the patient back again in 4 months' time, but should he get any side effects from the metoprolol, he is to contact us immediately.  He is aware that one of the major complications associated with atrial fibrillation is stroke and that he is less likely to have stroke being on an anticoagulant, but with a CHADS-VASc score of 1 the jury is somewhat out, but as I mentioned, with larger atria now and that he is getting older, that is something I personally would recommend.  Again, he will discuss that.    It is my pleasure to be involved in the care of this nice patient.  We will see him again in 4 months' time.    Timoteo Rodarte MD, [facility]    cc:  Sheeba Alex MD  [facility]  [address], [address]  [place] [address]    Timoteo Olmedo MD, [facility]        D: 2023   T: 2023   MT:     Name:     MARIFER GUTIERREZ  MRN:      [id number]        Account:      [id number]   :      1950           Service Date: 2023       Document: [id number]     Thank you for allowing me to participate in the care of your patient.      Sincerely,     Timoteo Rodarte MD, MD     [facility]  cc:   Timoteo Rodarte MD  [address]  [place] [address]

## 2023-03-31 NOTE — PROGRESS NOTES
HPI and Plan:   See dictation        Orders Placed This Encounter   Procedures     Follow-Up with Cardiology     EKG 12-lead, tracing only       Orders Placed This Encounter   Medications     metoprolol succinate ER (TOPROL XL) 25 MG 24 hr tablet     Sig: Take 1 tablet (25 mg) by mouth daily     Dispense:  90 tablet     Refill:  3       There are no discontinued medications.      Encounter Diagnoses   Name Primary?     Palpitations      Chronic atrial fibrillation (H)      Nonrheumatic mitral valve regurgitation        CURRENT MEDICATIONS:  Current Outpatient Medications   Medication Sig Dispense Refill     aspirin (ASA) 81 MG chewable tablet Take 81 mg by mouth daily       metoprolol succinate ER (TOPROL XL) 25 MG 24 hr tablet Take 1 tablet (25 mg) by mouth daily 90 tablet 3       ALLERGIES   No Known Allergies    PAST MEDICAL HISTORY:  Past Medical History:   Diagnosis Date     Atrial fibrillation (H)      Cancer (H)     COLON     Mitral valve regurgitation     Mod-severe MR (2-3+) per 5/14 echo       PAST SURGICAL HISTORY:  Past Surgical History:   Procedure Laterality Date     COLONOSCOPY N/A 10/1/2015    Procedure: COLONOSCOPY;  Surgeon: Markus Moore MD;  Location:  GI     COLONOSCOPY N/A 1/24/2023    Procedure: COLONOSCOPY, FLEXIBLE, WITH LESION REMOVAL USING SNARE;  Surgeon: Ray Burgos MD;  Location:  GI     GI SURGERY      partial colon resection for CA     HERNIA REPAIR      (R) inguinal  hernia repair     HERNIORRHAPHY EPIGASTRIC N/A 7/6/2016    Procedure: HERNIORRHAPHY EPIGASTRIC;  Surgeon: Shotry Salomon MD;  Location: [facility]     HERNIORRHAPHY INGUINAL Right 7/6/2016    Procedure: HERNIORRHAPHY INGUINAL;  Surgeon: Shorty Salomon MD;  Location: [facility]     VASCULAR SURGERY      VARICOSE VEIN SURGERY BILAT       FAMILY HISTORY:  Family History   Problem Relation Age of Onset     Respiratory Mother      Cancer Father 65        Lung CA     Diabetes Maternal Grandmother       "Cardiovascular Brother         Royal Vazquez       SOCIAL HISTORY:  Social History     Socioeconomic History     Marital status:      Spouse name: None     Number of children: None     Years of education: None     Highest education level: None   Tobacco Use     Smoking status: Some Days     Types: Cigars     Smokeless tobacco: Never     Tobacco comments:     pt smokes 1 cigar per week   Substance and Sexual Activity     Alcohol use: No     Alcohol/week: 0.0 standard drinks     Drug use: No   Other Topics Concern     Caffeine Concern Yes     Comment: 2-3 cups/day     Sleep Concern No     Stress Concern No     Weight Concern No     Special Diet No     Exercise Yes     Comment: running 1-2x/week x1.5 hours     Seat Belt Yes       Review of Systems:  Skin:          Eyes:         ENT:         Respiratory:          Cardiovascular:         Gastroenterology:        Genitourinary:         Musculoskeletal:         Neurologic:         Psychiatric:         Heme/Lymph/Imm:         Endocrine:           Physical Exam:  Vitals: /82   Pulse 80   Ht 1.854 m (6' 1\")   Wt 82.4 kg (181 lb 11.2 oz)   SpO2 97%   BMI 23.97 kg/m      Constitutional:           Skin:             Head:           Eyes:           Lymph:      ENT:           Neck:           Respiratory:            Cardiac:                                                           GI:           Extremities and Muscular Skeletal:                 Neurological:           Psych:           CC  Timoteo Rodarte MD  [address]  [place] [id number]              "

## 2023-04-09 ENCOUNTER — HEALTH MAINTENANCE LETTER (OUTPATIENT)
Age: 73
End: 2023-04-09

## 2023-05-13 NOTE — PROGRESS NOTES
Discharge Note:  Pt completed lymphedema therapy treatment with all goals met.  See daily document for details.  Pt to continue home program  Liseth Hilton OTR/BHARTI,CHAPO

## 2023-05-22 ENCOUNTER — TRANSFERRED RECORDS (OUTPATIENT)
Dept: HEALTH INFORMATION MANAGEMENT | Facility: CLINIC | Age: 73
End: 2023-05-22

## 2023-06-23 DIAGNOSIS — I48.20 CHRONIC ATRIAL FIBRILLATION (H): ICD-10-CM

## 2023-06-23 RX ORDER — METOPROLOL SUCCINATE 25 MG/1
25 TABLET, EXTENDED RELEASE ORAL DAILY
Qty: 90 TABLET | Refills: 0 | Status: SHIPPED | OUTPATIENT
Start: 2023-06-23 | End: 2023-08-29

## 2023-08-29 ENCOUNTER — TELEPHONE (OUTPATIENT)
Dept: CARDIOLOGY | Facility: CLINIC | Age: 73
End: 2023-08-29
Payer: COMMERCIAL

## 2023-08-29 DIAGNOSIS — I48.20 CHRONIC ATRIAL FIBRILLATION (H): ICD-10-CM

## 2023-08-29 RX ORDER — METOPROLOL SUCCINATE 25 MG/1
25 TABLET, EXTENDED RELEASE ORAL DAILY
Qty: 90 TABLET | Refills: 0 | Status: SHIPPED | OUTPATIENT
Start: 2023-08-29 | End: 2023-10-24

## 2023-10-10 ENCOUNTER — TRANSFERRED RECORDS (OUTPATIENT)
Dept: HEALTH INFORMATION MANAGEMENT | Facility: CLINIC | Age: 73
End: 2023-10-10

## 2023-10-24 DIAGNOSIS — I48.20 CHRONIC ATRIAL FIBRILLATION (H): ICD-10-CM

## 2023-10-24 RX ORDER — METOPROLOL SUCCINATE 25 MG/1
25 TABLET, EXTENDED RELEASE ORAL DAILY
Qty: 90 TABLET | Refills: 0 | Status: SHIPPED | OUTPATIENT
Start: 2023-10-24 | End: 2024-01-10

## 2024-01-10 DIAGNOSIS — I48.20 CHRONIC ATRIAL FIBRILLATION (H): ICD-10-CM

## 2024-01-10 RX ORDER — METOPROLOL SUCCINATE 25 MG/1
25 TABLET, EXTENDED RELEASE ORAL DAILY
Qty: 90 TABLET | Refills: 1 | Status: SHIPPED | OUTPATIENT
Start: 2024-01-10 | End: 2024-05-30

## 2024-01-29 ENCOUNTER — OFFICE VISIT (OUTPATIENT)
Dept: CARDIOLOGY | Facility: CLINIC | Age: 74
End: 2024-01-29
Payer: COMMERCIAL

## 2024-01-29 VITALS
WEIGHT: 179.9 LBS | OXYGEN SATURATION: 99 % | BODY MASS INDEX: 23.84 KG/M2 | DIASTOLIC BLOOD PRESSURE: 92 MMHG | HEIGHT: 73 IN | HEART RATE: 84 BPM | SYSTOLIC BLOOD PRESSURE: 132 MMHG

## 2024-01-29 DIAGNOSIS — R00.2 PALPITATIONS: ICD-10-CM

## 2024-01-29 DIAGNOSIS — I34.0 NONRHEUMATIC MITRAL VALVE REGURGITATION: ICD-10-CM

## 2024-01-29 DIAGNOSIS — I48.20 CHRONIC ATRIAL FIBRILLATION (H): ICD-10-CM

## 2024-01-29 PROCEDURE — 93000 ELECTROCARDIOGRAM COMPLETE: CPT | Performed by: INTERNAL MEDICINE

## 2024-01-29 PROCEDURE — 99214 OFFICE O/P EST MOD 30 MIN: CPT | Performed by: INTERNAL MEDICINE

## 2024-01-29 NOTE — PROGRESS NOTES
HPI and Plan:   It is my pleasure to see your patient Pan Bills who is a 73-year-old patient who had been followed for quite a long time with Dr. Calderon but has been seen in my clinic for the last 2 years.  This is a gentleman with a history of chronic atrial fibrillation and moderate mitral regurgitation.    Since I last saw him he has done well.  He has no palpitations, no shortness of breath and no chest discomfort.  The twelve-lead electrocardiogram today shows that the patient continues to be in atrial fibrillation with a ventricular rate of 84 bpm.  He is taking metoprolol succinate without side effects at 25 mg/day.  Initially his blood pressure was raised into the 150s systolic but on repeat at the end of our visit he systolic pressure dropped down to 132 the diastolic pressure is mildly raised at 92.  He tells me at home his systolic pressures are usually in the 120s.    You remember we did have a long discussion about the use of anticoagulation and he chose to use aspirin rather than an anticoagulant.  He also chose not to go with electrical cardioversion back to sinus rhythm and that is a very reasonable approach because he is entirely asymptomatic.  Going back into sinus rhythm does not improve survival.  I did tell him however that when he is 75 years of age he then will have a [id number] score of 2 and in the mail of this indicates anticoagulation rather than aspirin for stroke prophylaxis.    Impression:  1.  Chronic atrial fibrillation.  His ventricular rate appears to be quite well-controlled with metoprolol.  At present he is chosen aspirin rather than anticoagulation.  2.  Blood pressure is on the higher side when he came in today but he feels this is due to whitecoat hypertension and blood pressure measurements at home are usually well within the normal range.  3.  Moderate mitral regurgitation on echocardiography in February of last year.    Plan:  1.  I will repeat the echocardiogram in  the next few weeks to follow the mitral regurgitation.  2.  The patient will check his blood pressure morning and evening for the next 2 weeks and send the results in to us.  3.  The patient return to see us in 1 years time I will repeat the echocardiogram and EKG at that time.  Later, next year in October, he will turn 75 years of age and at that stage he should be placed on an anticoagulant to prevent stroke.    As always the patient has been told to contact me if he has any questions or any concerns.     Timoteo Rodarte MD, FACC, FRCPI.      Orders Placed This Encounter   Procedures    Follow-Up with Cardiology    EKG 12-lead complete w/read - Clinics (performed today)    EKG 12-lead complete w/read - Clinics (performed today)    EKG 12-lead complete w/read - Clinics (to be scheduled)    Echocardiogram Complete    Echocardiogram Complete       No orders of the defined types were placed in this encounter.      There are no discontinued medications.      Encounter Diagnoses   Name Primary?    Palpitations     Chronic atrial fibrillation (H)     Nonrheumatic mitral valve regurgitation        CURRENT MEDICATIONS:  Current Outpatient Medications   Medication Sig Dispense Refill    aspirin (ASA) 81 MG chewable tablet Take 81 mg by mouth daily      metoprolol succinate ER (TOPROL XL) 25 MG 24 hr tablet Take 1 tablet (25 mg) by mouth daily 90 tablet 1       ALLERGIES   No Known Allergies    PAST MEDICAL HISTORY:  Past Medical History:   Diagnosis Date    Atrial fibrillation (H)     Cancer (H)     COLON    Mitral valve regurgitation     Mod-severe MR (2-3+) per 5/14 echo       PAST SURGICAL HISTORY:  Past Surgical History:   Procedure Laterality Date    COLONOSCOPY N/A 10/1/2015    Procedure: COLONOSCOPY;  Surgeon: Markus Moore MD;  Location:  GI    COLONOSCOPY N/A 1/24/2023    Procedure: COLONOSCOPY, FLEXIBLE, WITH LESION REMOVAL USING SNARE;  Surgeon: Ray Burgos MD;  Location:  GI    GI SURGERY    "   partial colon resection for CA    HERNIA REPAIR      (R) inguinal  hernia repair    HERNIORRHAPHY EPIGASTRIC N/A 7/6/2016    Procedure: HERNIORRHAPHY EPIGASTRIC;  Surgeon: Shorty Salomon MD;  Location: [facility]    HERNIORRHAPHY INGUINAL Right 7/6/2016    Procedure: HERNIORRHAPHY INGUINAL;  Surgeon: Shorty Salomon MD;  Location: [facility]    VASCULAR SURGERY      VARICOSE VEIN SURGERY BILAT       FAMILY HISTORY:  Family History   Problem Relation Age of Onset    Respiratory Mother     Cancer Father 65        Lung CA    Diabetes Maternal Grandmother     Cardiovascular Brother         Royal Parkinsons       SOCIAL HISTORY:  Social History     Socioeconomic History    Marital status:      Spouse name: None    Number of children: None    Years of education: None    Highest education level: None   Tobacco Use    Smoking status: Some Days     Types: Cigars    Smokeless tobacco: Never    Tobacco comments:     pt smokes 1 cigar per week   Substance and Sexual Activity    Alcohol use: No     Alcohol/week: 0.0 standard drinks of alcohol    Drug use: No   Other Topics Concern    Caffeine Concern Yes     Comment: 2-3 cups/day    Sleep Concern No    Stress Concern No    Weight Concern No    Special Diet No    Exercise Yes     Comment: running 1-2x/week x1.5 hours    Seat Belt Yes       Review of Systems:  Skin:          Eyes:         ENT:         Respiratory:          Cardiovascular:         Gastroenterology:        Genitourinary:         Musculoskeletal:         Neurologic:         Psychiatric:         Heme/Lymph/Imm:         Endocrine:           Physical Exam:  Vitals: BP (!) 132/92   Pulse 84   Ht 1.854 m (6' 1\")   Wt 81.6 kg (179 lb 14.4 oz)   SpO2 99%   BMI 23.73 kg/m      Constitutional:  cooperative, alert and oriented, well developed, well nourished, in no acute distress        Skin:  warm and dry to the touch, no apparent skin lesions or masses noted          Head:  normocephalic, no masses or " lesions        Eyes:  pupils equal and round        Lymph:No Cervical lymphadenopathy present     ENT:  no pallor or cyanosis, dentition good        Neck:  carotid pulses are full and equal bilaterally, JVP normal, no carotid bruit        Respiratory:  normal breath sounds, clear to auscultation, normal A-P diameter, normal symmetry, normal respiratory excursion, no use of accessory muscles         Cardiac:  (variable S1 and normal S2) irregular rhythm   no presence of murmur apical;radiation to the axilla;late systolic murmur;grade 2        pulses full and equal, no bruits auscultated                                        GI:  abdomen soft, non-tender, BS normoactive, no mass, no HSM, no bruits        Extremities and Muscular Skeletal:  no deformities, clubbing, cyanosis, erythema observed stasis pigmentation   RLE edema;2+;pitting LLE edema;1+;pitting      Neurological:  no gross motor deficits;affect appropriate        Psych:  Alert and Oriented x 3        CC  Timoteo Rodarte MD  [address]  [place] [id number]

## 2024-01-29 NOTE — LETTER
1/29/2024    Sheeba Alex MD  [address]  [facility] [address]    RE: Pan Bills       Dear Colleague,     I had the pleasure of seeing Pan Bills in the [facility].  HPI and Plan:   It is my pleasure to see your patient Pan Bills who is a 73-year-old patient who had been followed for quite a long time with Dr. Calderon but has been seen in my clinic for the last 2 years.  This is a gentleman with a history of chronic atrial fibrillation and moderate mitral regurgitation.    Since I last saw him he has done well.  He has no palpitations, no shortness of breath and no chest discomfort.  The twelve-lead electrocardiogram today shows that the patient continues to be in atrial fibrillation with a ventricular rate of 84 bpm.  He is taking metoprolol succinate without side effects at 25 mg/day.  Initially his blood pressure was raised into the 150s systolic but on repeat at the end of our visit he systolic pressure dropped down to 132 the diastolic pressure is mildly raised at 92.  He tells me at home his systolic pressures are usually in the 120s.    You remember we did have a long discussion about the use of anticoagulation and he chose to use aspirin rather than an anticoagulant.  He also chose not to go with electrical cardioversion back to sinus rhythm and that is a very reasonable approach because he is entirely asymptomatic.  Going back into sinus rhythm does not improve survival.  I did tell him however that when he is 75 years of age he then will have a [id number] score of 2 and in the mail of this indicates anticoagulation rather than aspirin for stroke prophylaxis.    Impression:  1.  Chronic atrial fibrillation.  His ventricular rate appears to be quite well-controlled with metoprolol.  At present he is chosen aspirin rather than anticoagulation.  2.  Blood pressure is on the higher side when he came in today but he feels this is due to whitecoat hypertension and blood  pressure measurements at home are usually well within the normal range.  3.  Moderate mitral regurgitation on echocardiography in February of last year.    Plan:  1.  I will repeat the echocardiogram in the next few weeks to follow the mitral regurgitation.  2.  The patient will check his blood pressure morning and evening for the next 2 weeks and send the results in to us.  3.  The patient return to see us in 1 years time I will repeat the echocardiogram and EKG at that time.  Later, next year in October, he will turn 75 years of age and at that stage he should be placed on an anticoagulant to prevent stroke.    As always the patient has been told to contact me if he has any questions or any concerns.     Timoteo Rodarte MD, FACC, FRCPI.      Orders Placed This Encounter   Procedures    Follow-Up with Cardiology    EKG 12-lead complete w/read - Clinics (performed today)    EKG 12-lead complete w/read - Clinics (performed today)    EKG 12-lead complete w/read - Clinics (to be scheduled)    Echocardiogram Complete    Echocardiogram Complete       No orders of the defined types were placed in this encounter.      There are no discontinued medications.      Encounter Diagnoses   Name Primary?    Palpitations     Chronic atrial fibrillation (H)     Nonrheumatic mitral valve regurgitation        CURRENT MEDICATIONS:  Current Outpatient Medications   Medication Sig Dispense Refill    aspirin (ASA) 81 MG chewable tablet Take 81 mg by mouth daily      metoprolol succinate ER (TOPROL XL) 25 MG 24 hr tablet Take 1 tablet (25 mg) by mouth daily 90 tablet 1       ALLERGIES   No Known Allergies    PAST MEDICAL HISTORY:  Past Medical History:   Diagnosis Date    Atrial fibrillation (H)     Cancer (H)     COLON    Mitral valve regurgitation     Mod-severe MR (2-3+) per 5/14 echo       PAST SURGICAL HISTORY:  Past Surgical History:   Procedure Laterality Date    COLONOSCOPY N/A 10/1/2015    Procedure: COLONOSCOPY;  Surgeon:  "Markus Moore MD;  Location:  GI    COLONOSCOPY N/A 1/24/2023    Procedure: COLONOSCOPY, FLEXIBLE, WITH LESION REMOVAL USING SNARE;  Surgeon: Ray Burgos MD;  Location:  GI    GI SURGERY      partial colon resection for CA    HERNIA REPAIR      (R) inguinal  hernia repair    HERNIORRHAPHY EPIGASTRIC N/A 7/6/2016    Procedure: HERNIORRHAPHY EPIGASTRIC;  Surgeon: Shorty Salomon MD;  Location: [facility]    HERNIORRHAPHY INGUINAL Right 7/6/2016    Procedure: HERNIORRHAPHY INGUINAL;  Surgeon: Shorty Salomon MD;  Location: [facility]    VASCULAR SURGERY      VARICOSE VEIN SURGERY BILAT       FAMILY HISTORY:  Family History   Problem Relation Age of Onset    Respiratory Mother     Cancer Father 65        Lung CA    Diabetes Maternal Grandmother     Cardiovascular Brother         Paige Parkinsons       SOCIAL HISTORY:  Social History     Socioeconomic History    Marital status:      Spouse name: None    Number of children: None    Years of education: None    Highest education level: None   Tobacco Use    Smoking status: Some Days     Types: Cigars    Smokeless tobacco: Never    Tobacco comments:     pt smokes 1 cigar per week   Substance and Sexual Activity    Alcohol use: No     Alcohol/week: 0.0 standard drinks of alcohol    Drug use: No   Other Topics Concern    Caffeine Concern Yes     Comment: 2-3 cups/day    Sleep Concern No    Stress Concern No    Weight Concern No    Special Diet No    Exercise Yes     Comment: running 1-2x/week x1.5 hours    Seat Belt Yes       Review of Systems:  Skin:          Eyes:         ENT:         Respiratory:          Cardiovascular:         Gastroenterology:        Genitourinary:         Musculoskeletal:         Neurologic:         Psychiatric:         Heme/Lymph/Imm:         Endocrine:           Physical Exam:  Vitals: BP (!) 132/92   Pulse 84   Ht 1.854 m (6' 1\")   Wt 81.6 kg (179 lb 14.4 oz)   SpO2 99%   BMI 23.73 kg/m      Constitutional:  " cooperative, alert and oriented, well developed, well nourished, in no acute distress        Skin:  warm and dry to the touch, no apparent skin lesions or masses noted          Head:  normocephalic, no masses or lesions        Eyes:  pupils equal and round        Lymph:No Cervical lymphadenopathy present     ENT:  no pallor or cyanosis, dentition good        Neck:  carotid pulses are full and equal bilaterally, JVP normal, no carotid bruit        Respiratory:  normal breath sounds, clear to auscultation, normal A-P diameter, normal symmetry, normal respiratory excursion, no use of accessory muscles         Cardiac:  (variable S1 and normal S2) irregular rhythm   no presence of murmur apical;radiation to the axilla;late systolic murmur;grade 2        pulses full and equal, no bruits auscultated                                        GI:  abdomen soft, non-tender, BS normoactive, no mass, no HSM, no bruits        Extremities and Muscular Skeletal:  no deformities, clubbing, cyanosis, erythema observed stasis pigmentation   RLE edema;2+;pitting LLE edema;1+;pitting      Neurological:  no gross motor deficits;affect appropriate        Psych:  Alert and Oriented x 3        CC  Timoteo Rodarte MD  [address]  [place] [address]      Thank you for allowing me to participate in the care of your patient.      Sincerely,     Timoteo Rodarte MD, MD     [facility]

## 2024-05-30 DIAGNOSIS — I48.20 CHRONIC ATRIAL FIBRILLATION (H): ICD-10-CM

## 2024-05-30 RX ORDER — METOPROLOL SUCCINATE 25 MG/1
25 TABLET, EXTENDED RELEASE ORAL DAILY
Qty: 90 TABLET | Refills: 2 | Status: SHIPPED | OUTPATIENT
Start: 2024-05-30

## 2024-06-16 ENCOUNTER — HEALTH MAINTENANCE LETTER (OUTPATIENT)
Age: 74
End: 2024-06-16

## 2024-06-17 ENCOUNTER — ALLIED HEALTH/NURSE VISIT (OUTPATIENT)
Dept: RESEARCH | Facility: CLINIC | Age: 74
End: 2024-06-17
Payer: COMMERCIAL

## 2024-06-17 VITALS
SYSTOLIC BLOOD PRESSURE: 150 MMHG | HEART RATE: 86 BPM | RESPIRATION RATE: 16 BRPM | BODY MASS INDEX: 22.8 KG/M2 | WEIGHT: 172 LBS | OXYGEN SATURATION: 99 % | HEIGHT: 73 IN | TEMPERATURE: 97.9 F | DIASTOLIC BLOOD PRESSURE: 101 MMHG

## 2024-06-17 DIAGNOSIS — Z00.6 RESEARCH SUBJECT: Primary | ICD-10-CM

## 2024-06-17 PROCEDURE — 93005 ELECTROCARDIOGRAM TRACING: CPT

## 2024-06-17 PROCEDURE — 99207 PR NO CHARGE-RESEARCH SERVICE: CPT

## 2024-06-17 PROCEDURE — 93000 ELECTROCARDIOGRAM COMPLETE: CPT | Mod: RTG | Performed by: INTERNAL MEDICINE

## 2024-06-17 NOTE — PROGRESS NOTES
"     Whoop Study    Study Description: The Pirqop ECG feature is a software-only mobile medical application intended for use with the Whoop strap to create, record, store, transfer and display a single-channel electrocardiogram (ECG) qualitatively similar to a lead I ECG.    SCREENING       Demographic Info  Pan Bills   1950          73 year old  male    Woman of Child Bearing Potential?  N/A (Male)   If no, Why? N/A    Race: White/  Ethnicity: Not  or      Time Seated/Time of Assessment: 08:55:00 HH:MM:SS    Heart Abnormalities:  History of Heart Rhythm Abnormalities? Yes Specify Below  AF long-standing (>12 months duration) Onset Year: 2014      Past Medical History:   Diagnosis Date    Atrial fibrillation (H); Ongoing  5/19/2014       No Known Allergies       Current Outpatient Medications:   Medication Name (Generic) Indication Start Date Ongoing? Dose Unit Frequency Route   Acetylsalicylic Acid  Atrial fibrillation 09/09/2022 Yes 81  mg QD Oral   Metoprolol succinate Atrial fibrillation 05/30/2024 Yes 25 mg QD Oral   Late  Note: Generic name of aspirin entered incorrectly initially. Corrected acetylsalicylic acid. -[id number]    Lifestyle Habits  How often do you...  Exercise: Frequently (a few times a week)  Consume Caffeine:Daily - Heavy (more than 1 serving a day)  Use Tobacco/Nicotine: Rarely (few times a year)  Consume Alcohol: Never  Use Recreational Drugs: Never    Dominant Hand: Right  Preferred Band Hand: Left  Reason for Choosing Band Hand: Subject preference    Skin Fold Thickness: 2.2 mm  Band Wrist Circumference: 164 mm  Wrist Hairiness: Fine, low density    Does the subject have tattoos, moles, or scars on band wrist? No  Jama Skin Tone: Jama: 3    Vitals Assessment Time 09:13:00  BP (!) 150/101 (BP Location: Right arm, Patient Position: Sitting, Cuff Size: Adult Regular)   Pulse 86   Temp 97.9  F (36.6  C)   Resp 16   Ht 1.854 m (6' 1\") "   Wt 78 kg (172 lb)   SpO2 99%   BMI 22.69 kg/m      Wait 1 minute between repeat measurements   Heart Rate (bpm) SpO2 (%) Blood Pressure     Assessment Time Result Result  Result    2nd Measurement 09:16:00 79 98 151/114   3rd Measurement 09:18:00 85 97 129/100       Please see Screening ECG for providers rhythm interpretation.    ECG Rhythm Strip Time 09:27:23    Physical Exam Time of Assessment is equivalent to ECG time as TARIK is present for the ECG recording and continues their assessment thereafter.     ENROLLMENT     Subject has met all requirements and is Enrolled in the Study?: Yes    Enrollment Number: [id number]   Sportomato User ID: [id number]     DATA COLLECTION     Device Information    Study mobile device and laptop synchronized? Yes    Comparative (Holter) device recorder date and time set according to local clock? Yes    Longboard Media Kit 1: Strap SN: [id number]  Study Phone: [phone]     Was the UbiterraOP Strap Applied? Yes  Time of Application: 10:01:00     Subject reviewed Sportomato device instructions for use? Yes    Comparative (Holter) ECG device applied? Yes  Time of  Holter Application: 10:12:16    Comparative Holter Kit IDs (insert name Janie)  Study Laptop Device  [id number]  Holter Device S/N  M12R - [id number]     Lead Placement QC Performed by:  Janell MARTINEZ    Practice Strap ECG Taken? Yes  Number of Practice Trials: 2     Data recording for ECG-Resting and ECG-Exercise sections- were directly entered into EDC during study visit.       END OF STUDY     Was the wrist device removed after all the trials? Yes  Was the 12 lead ECG Holter detached after all the trials? Yes    Any Adverse Events? No  Any Protocol Deviations? No    Any changes in medication since screening visit? No  Any device deficiencies? No If yes complete a device deficiency note    Is all study data for this visit collected? Yes      Date Subject Exited the Study: 17-JUN-2024  Time Subject Exited the Study: 10:57:00     Did the subject successfully  complete the study? Yes   If no, Why? N/A        17-JUN-2024    Marah Busby

## 2024-06-17 NOTE — PROGRESS NOTES
Study Description: The objective of this study is to evaluate the safety and performance of the WHOOP ECG feature in adults (22 years or older) with normal sinus rhythm or diagnosed with AF.    Post Exercise Assessment: Pan Bills  denies symptoms post exercise a 12 lead ECG was completed and reviewed.     Electrocardiogram has returned to baseline and Pan Bills was released from the clinical research unit and completed the study.     17 -JUN- 2024    Penelope Mirza PA-C

## 2024-06-17 NOTE — PROGRESS NOTES
[facility] Inclusion/Exclusion Criteria:    Study Name: [facility]   : Naeem Bravo MD      Study Description: The [employer] ECG feature is a software-only mobile medical application intended for use with the Whoop strap to create, record, store, transfer and display a single-channel electrocardiogram (ECG) qualitatively similar to a lead I ECG.     Protocol Version: 3.0 (30-Apr-2024)  Consent Version: 2.0 ( 12-Apr-2024 )    Criteria #  Inclusion Criteria (ALL MUST BE YES)  YES/NO/N/A   1  Aged 22 years or older  Yes   2 Ability to provide informed consent Yes   3 Willing to participate and to follow the procedures per the Principle Investigator's instructions Yes   4  Resides in the [place]    Yes   5   Wrist circumference: 130 mm to 245 mm at band wear position Yes   6    Previous medical history of persistent, permanent, or chronic AF and being in AF at the time of enrollment (AF Cohort Only)    Yes   7     No known history of AF and being in normal sinus rhythm at the time of enrollment (SR Cohort Only) NA             Criteria # Exclusion Criteria (ALL MUST BE NO) YES/NO/N/A   1  Subjects with an implantable pacemaker device or implantable cardioverter-defibrillator device No   2 Medical history of a life-threatening cardiac arrhythmia eg., Ventricular tachycardia or fibrillation No   3  Any known allergies to medical adhesives, isopropyl alcohol, or ECG patch    No   4  Any known allergy or or sensitivity to thermoplastics, metals with PVD coatings or Elastane used in the wrist fitness device  No   5  Clinically significant body tremors that compromise study measurements    No   6  Pregnant at the time of enrollment   No   7  Any physical disability that prevents safe and adequate testing    No   8  Physical or medical impairments that preclude exercise testing, including, but not limited to, back pain and leg claudication No   9  Mental impairment as determined by the Investigator or  designee    No   10  Subjects with any medical history, physical examination finding, vital sign or other finding or assessment that could compromise subject safety, study integrity or accurate that could compromise subject safety, study integrity or accurate assessment of study objectives. This includes, but is not limited to, known untreated medical conditions that may be considered clinically significant, such as significant anemia, electrolyte imbalance, untreated or uncontrolled thyroid disease, and open wound(s) in the areas of test band positioning   No   11    Vital sign measurements, medical history of physical examination finding that makes the subject inappropriate for participation according to the investigator * No   12    Scarring, tattoos, large, pigmented moles or other skin pathology in the area of sensor location  No   13    Severe skin conditions on either wrist, that would preclude wearing the sensor. Severe symptomatic skin injury, disorder, allergy or disease such as eczema, rosacea, impetigo, dermatomyositis, or contact dermatitis on wrists or other areas where sensors or electrodes will be placed  No   14    Clinically significant hand tremors as judged by the Investigator No   15  Participated in Phase 1 of the study (Only for Phase 2 cohort)  No   * If subject is a screen fail due to PE findings, choose whichever exclusion criteria matches that finding in addition to E10.     Patient does fulfill study inclusion criteria and no exclusion criteria are found.     Naeem Bravo MD    17-JUN-2024    Marah Busby

## 2024-06-17 NOTE — PROGRESS NOTES
"    Study Physical Exam      Medical History Reviewed? Yes    Physical Examination  For abnormal findings, please evaluate if the finding is Clinically Significant (by 'CS') or Not Clinically Significant (by 'NCS')  General Appearance   Abnormal; NCS wears compression socks  Head and Neck   Normal  Eyes     Abnormal; NCS wears glasses  Ears, Nose Mouth and Throat  Normal  Cardiovascular   Abnormal; NCS due to history of atrial fibrillation ; irregular rhythm  Respiratory    Normal  Skin and Hair of Wrists  Normal      Physical disability that presents safe or adequate testing: Not present  Dermatological conditions that preclude wearing of sensor or satisfactory data acquisition: Not present  Tremor: Not present  Other: Normal    Vitals:    06/17/24 0913   BP: 150/101   BP Location: Right arm   Patient Position: Sitting   Cuff Size: Adult Regular   Pulse: 86   Resp: 16   Temp: 97.9  F (36.6  C)   SpO2: 99%   Weight: 78 kg (172 lb)   Height: 1.854 m (6' 1\")                Electrocardiogram Interpretation:   12 Lead Interpretation: Atrial Fibrillation       17-JUN-2024    Penelope Mirza PA-C      "

## 2024-06-17 NOTE — PROGRESS NOTES
Whoop Study Consent    Study Description: The objective of this study is to evaluate the safety and performance of the WHOOP ECG feature in adults (22 years or older) with normal sinus rhythm or diagnosed with AF.      CONSENT     Pan Bills a 73 year old male, was on-site today to discuss participation in the Whoop Study.       The consent form was reviewed with the patient.     The review of the study included:  Study Purpose    Participant Responsibilities    Study Data and Devices    Benefits and Risks of Participation    Compensation and Costs of Participation    Voluntary Participation    Confidentiality    Injury and Legal Rights      Protocol Version: 3.0 (Version Date 30-Apr-2024)    Screening Number: [id number]    The subject was queried in regards to his willingness to continue and his questions were answered to his satisfaction.   The patient has given his agreement to volunteer and participate in the above noted study.   The eConsent and HIPAA form version 2.0 (Version Date 12-Apr-2024) was signed  on  17-JUN-2024 with the [facility] of [facility].     A copy of the consent will be placed in subject's medical record. A copy of the consent form was given to the subject today.    Study data is directly entered into Epic and Desktime per protocol.     No study procedures were done prior to Pan Bills providing informed consent.     Has this subject had a previous screen failure in this study? No    If yes, previous Subject Number: NA     Study Phase: Phase 2    17-JUN-2024    Marah Busby

## 2024-06-18 LAB
ATRIAL RATE - MUSE: 441 BPM
ATRIAL RATE - MUSE: 91 BPM
DIASTOLIC BLOOD PRESSURE - MUSE: NORMAL MMHG
DIASTOLIC BLOOD PRESSURE - MUSE: NORMAL MMHG
INTERPRETATION ECG - MUSE: NORMAL
INTERPRETATION ECG - MUSE: NORMAL
P AXIS - MUSE: NORMAL DEGREES
P AXIS - MUSE: NORMAL DEGREES
PR INTERVAL - MUSE: NORMAL MS
PR INTERVAL - MUSE: NORMAL MS
QRS DURATION - MUSE: 78 MS
QRS DURATION - MUSE: 86 MS
QT - MUSE: 366 MS
QT - MUSE: 366 MS
QTC - MUSE: 386 MS
QTC - MUSE: 414 MS
R AXIS - MUSE: 85 DEGREES
R AXIS - MUSE: 89 DEGREES
SYSTOLIC BLOOD PRESSURE - MUSE: NORMAL MMHG
SYSTOLIC BLOOD PRESSURE - MUSE: NORMAL MMHG
T AXIS - MUSE: 70 DEGREES
T AXIS - MUSE: 76 DEGREES
VENTRICULAR RATE- MUSE: 67 BPM
VENTRICULAR RATE- MUSE: 77 BPM

## 2024-06-18 PROCEDURE — 93010 ELECTROCARDIOGRAM REPORT: CPT | Performed by: INTERNAL MEDICINE

## 2024-07-08 ENCOUNTER — TELEPHONE (OUTPATIENT)
Dept: CARDIOLOGY | Facility: CLINIC | Age: 74
End: 2024-07-08
Payer: COMMERCIAL

## 2024-07-08 NOTE — TELEPHONE ENCOUNTER
Please be advised that we have attempted to reach this patient to schedule Cardiac Testing however two attempts have been made to contact patient and they have not returned our call.  No further attempts to reach this patient will by made by scheduling team.  Please contact this patient to encourage them to call our office at [phone] and schedule this order if it is still clinically recommended.      Thank you for allowing [facility] to participate in this patients healthcare needs.

## 2024-11-30 ENCOUNTER — APPOINTMENT (OUTPATIENT)
Dept: ULTRASOUND IMAGING | Facility: CLINIC | Age: 74
End: 2024-11-30
Attending: EMERGENCY MEDICINE
Payer: COMMERCIAL

## 2024-11-30 ENCOUNTER — HOSPITAL ENCOUNTER (EMERGENCY)
Facility: CLINIC | Age: 74
Discharge: HOME | End: 2024-11-30
Attending: PHYSICIAN ASSISTANT | Admitting: PHYSICIAN ASSISTANT
Payer: COMMERCIAL

## 2024-11-30 VITALS
DIASTOLIC BLOOD PRESSURE: 97 MMHG | RESPIRATION RATE: 16 BRPM | SYSTOLIC BLOOD PRESSURE: 143 MMHG | TEMPERATURE: 97.3 F | HEART RATE: 91 BPM | OXYGEN SATURATION: 100 %

## 2024-11-30 DIAGNOSIS — I80.01 THROMBOPHLEBITIS OF SUPERFICIAL VEINS OF RIGHT LOWER EXTREMITY: ICD-10-CM

## 2024-11-30 LAB
ANION GAP SERPL CALCULATED.3IONS-SCNC: 10 MMOL/L (ref 7–15)
BASOPHILS # BLD AUTO: 0.1 10E3/UL (ref 0–0.2)
BASOPHILS NFR BLD AUTO: 1 %
BUN SERPL-MCNC: 18.9 MG/DL (ref 8–23)
CALCIUM SERPL-MCNC: 9.2 MG/DL (ref 8.8–10.4)
CHLORIDE SERPL-SCNC: 98 MMOL/L (ref 98–107)
CREAT SERPL-MCNC: 0.95 MG/DL (ref 0.67–1.17)
EGFRCR SERPLBLD CKD-EPI 2021: 84 ML/MIN/1.73M2
EOSINOPHIL # BLD AUTO: 0.2 10E3/UL (ref 0–0.7)
EOSINOPHIL NFR BLD AUTO: 3 %
ERYTHROCYTE [DISTWIDTH] IN BLOOD BY AUTOMATED COUNT: 12.7 % (ref 10–15)
GLUCOSE SERPL-MCNC: 101 MG/DL (ref 70–99)
HCO3 SERPL-SCNC: 27 MMOL/L (ref 22–29)
HCT VFR BLD AUTO: 47 % (ref 40–53)
HGB BLD-MCNC: 16.3 G/DL (ref 13.3–17.7)
HOLD SPECIMEN: NORMAL
IMM GRANULOCYTES # BLD: 0 10E3/UL
IMM GRANULOCYTES NFR BLD: 0 %
LYMPHOCYTES # BLD AUTO: 1.6 10E3/UL (ref 0.8–5.3)
LYMPHOCYTES NFR BLD AUTO: 23 %
MCH RBC QN AUTO: 32.7 PG (ref 26.5–33)
MCHC RBC AUTO-ENTMCNC: 34.7 G/DL (ref 31.5–36.5)
MCV RBC AUTO: 94 FL (ref 78–100)
MONOCYTES # BLD AUTO: 0.7 10E3/UL (ref 0–1.3)
MONOCYTES NFR BLD AUTO: 9 %
NEUTROPHILS # BLD AUTO: 4.6 10E3/UL (ref 1.6–8.3)
NEUTROPHILS NFR BLD AUTO: 64 %
NRBC # BLD AUTO: 0 10E3/UL
NRBC BLD AUTO-RTO: 0 /100
PLATELET # BLD AUTO: 242 10E3/UL (ref 150–450)
POTASSIUM SERPL-SCNC: 5.2 MMOL/L (ref 3.4–5.3)
RBC # BLD AUTO: 4.98 10E6/UL (ref 4.4–5.9)
SODIUM SERPL-SCNC: 135 MMOL/L (ref 135–145)
WBC # BLD AUTO: 7.1 10E3/UL (ref 4–11)

## 2024-11-30 PROCEDURE — 99285 EMERGENCY DEPT VISIT HI MDM: CPT | Mod: 25 | Performed by: PHYSICIAN ASSISTANT

## 2024-11-30 PROCEDURE — 93005 ELECTROCARDIOGRAM TRACING: CPT | Performed by: PHYSICIAN ASSISTANT

## 2024-11-30 PROCEDURE — 93971 EXTREMITY STUDY: CPT | Mod: RT

## 2024-11-30 PROCEDURE — 80048 BASIC METABOLIC PNL TOTAL CA: CPT | Performed by: EMERGENCY MEDICINE

## 2024-11-30 PROCEDURE — 36415 COLL VENOUS BLD VENIPUNCTURE: CPT | Performed by: EMERGENCY MEDICINE

## 2024-11-30 PROCEDURE — 80048 BASIC METABOLIC PNL TOTAL CA: CPT | Performed by: PHYSICIAN ASSISTANT

## 2024-11-30 PROCEDURE — 85004 AUTOMATED DIFF WBC COUNT: CPT | Performed by: EMERGENCY MEDICINE

## 2024-11-30 PROCEDURE — 85025 COMPLETE CBC W/AUTO DIFF WBC: CPT | Performed by: PHYSICIAN ASSISTANT

## 2024-11-30 ASSESSMENT — ACTIVITIES OF DAILY LIVING (ADL)
ADLS_ACUITY_SCORE: 41

## 2024-11-30 NOTE — DISCHARGE INSTRUCTIONS
Stop the aspirin at this time.  Start the Xarelto as we discussed.  Follow up with primary doctor in the next few weeks to ensure improvement.  Consider use of heat, elevation, compression stockings as well.

## 2024-11-30 NOTE — ED PROVIDER NOTES
Emergency Department Note      History of Present Illness     Chief Complaint   Leg Pain      HPI   Pan Bills is a 74 year old male with a history of lymphedema, afib on aspirin, who presents to the ED for evaluation of leg pain. Patient notes onset of left lower extremity swelling, pain, redness that began 3-5 days ago. Pain is predominantly overlying lateral malleolus and proximal No preceding falls or trauma. No fevers or chills. No sensory changes.    Independent Historian   None    Review of External Notes   Cardiology visit on 1/29/24 - CHADS-VASc score of 1, hence aspirin at this time    Past Medical History     Medical History and Problem List   Past Medical History:   Diagnosis Date    Atrial fibrillation (H)     Cancer (H)     Mitral valve regurgitation        Medications   aspirin (ASA) 81 MG chewable tablet  metoprolol succinate ER (TOPROL XL) 25 MG 24 hr tablet  rivaroxaban ANTICOAGULANT (XARELTO) 10 MG TABS tablet        Surgical History   Past Surgical History:   Procedure Laterality Date    COLONOSCOPY N/A 10/1/2015    Procedure: COLONOSCOPY;  Surgeon: Markus Moore MD;  Location:  GI    COLONOSCOPY N/A 1/24/2023    Procedure: COLONOSCOPY, FLEXIBLE, WITH LESION REMOVAL USING SNARE;  Surgeon: Ray Burgos MD;  Location:  GI    GI SURGERY      partial colon resection for CA    HERNIA REPAIR      (R) inguinal  hernia repair    HERNIORRHAPHY EPIGASTRIC N/A 7/6/2016    Procedure: HERNIORRHAPHY EPIGASTRIC;  Surgeon: Shorty Salomon MD;  Location: [facility]    HERNIORRHAPHY INGUINAL Right 7/6/2016    Procedure: HERNIORRHAPHY INGUINAL;  Surgeon: Shorty Salomon MD;  Location: [facility]    VASCULAR SURGERY      VARICOSE VEIN SURGERY BILAT       Physical Exam     Patient Vitals for the past 24 hrs:   BP Temp Temp src Pulse Resp SpO2   11/30/24 1308 (!) 143/97 97.3  F (36.3  C) Temporal 91 16 100 %     Physical Exam  Constitutional: Pleasant. Cooperative.  Eyes: Pupils equally  round  HENT: Head is normal in appearance. Oropharynx is normal with moist mucus membranes.  Cardiovascular: Regular rate and rhythm without murmurs.  Respiratory: Normal respiratory effort, lungs clear to auscultation  Musculoskeletal: Bilateral pitting edema, 1+ on left, 2+ on right. Full ROM bilaterally. 2+ DP pulse on Right. No bony TTP.  Skin: Erythema and warmth noted to anterior, lateral, and posterior aspect of RLE from midcalf distally. Region overlying right lateral malleolus is tender as well.  Neurologic: Cranial nerves grossly intact, normal cognition, no apparent deficits. Sensation intact distally to RLE.  Psychiatric: Normal affect.  Nursing notes and vital signs reviewed.    Diagnostics     Lab Results   Labs Ordered and Resulted from Time of ED Arrival to Time of ED Departure   BASIC METABOLIC PANEL - Abnormal       Result Value    Sodium 135      Potassium 5.2      Chloride 98      Carbon Dioxide (CO2) 27      Anion Gap 10      Urea Nitrogen 18.9      Creatinine 0.95      GFR Estimate 84      Calcium 9.2      Glucose 101 (*)    CBC WITH PLATELETS AND DIFFERENTIAL    WBC Count 7.1      RBC Count 4.98      Hemoglobin 16.3      Hematocrit 47.0      MCV 94      MCH 32.7      MCHC 34.7      RDW 12.7      Platelet Count 242      % Neutrophils 64      % Lymphocytes 23      % Monocytes 9      % Eosinophils 3      % Basophils 1      % Immature Granulocytes 0      NRBCs per 100 WBC 0      Absolute Neutrophils 4.6      Absolute Lymphocytes 1.6      Absolute Monocytes 0.7      Absolute Eosinophils 0.2      Absolute Basophils 0.1      Absolute Immature Granulocytes 0.0      Absolute NRBCs 0.0         Imaging   US Lower Extremity Venous Duplex Right   Final Result   IMPRESSION:   1.  No deep venous thrombosis in the right lower extremity.   2.  Superficial thrombophlebitis involving the upper small saphenous vein and popliteal region superficial varicosities.          EKG   EKG 12-lead, tracing only     Value     Systolic Blood Pressure     Diastolic Blood Pressure     Ventricular Rate 77    Atrial Rate 91    ND Interval     QRS Duration 86        QTc 414    P Axis     R AXIS 89    T Axis 76    Interpretation ECG      Atrial fibrillation  Abnormal ECG  When compared with ECG of 17-JUN-2024 09:26,  No significant change was found  Confirmed by AFLRED BRENNAN MD LOC:JN [id number]) on 6/18/2024 12:27:01 PM         Independent Interpretation   None    ED Course      Medications Administered   Medications - No data to display    Procedures   Procedures     Discussion of Management   None    ED Course        Additional Documentation  None    Medical Decision Making / Diagnosis     CMS Diagnoses: None    MIPS       None    [facility]   Pan Bills is a 74 year old male with a history of A-fib on aspirin who presents to the ED for evaluation of left lower extremity swelling, pain, and redness.  See HPI as above for additional details.  Vitals and physical exam as above.  Differential is broad included fracture, strain, cellulitis, abscess, DVT, superficial thrombophlebitis, popliteal cyst, amongst others.  Workup obtained as above concerning for superficial thrombophlebitis.  No falls or trauma to suggest for bony involvement, no indication for x-ray at this time.  No evidence for DVT or popliteal cyst.  Did have some concern for cellulitis based upon exam, however given no leukocytosis, no fever, fever symptoms more consistent with superficial thrombophlebitis rather than cellulitis or abscess.  Per guidelines, given involvement of small saphenous vein, will start patient on short course of anticoagulation as below.  Discussed anticoagulation precautions. Discussed conservative cares otherwise. Advise close follow-up with PCP.  Do feel patient safe for discharge to home. Discussed reasons to return. All questions answered. Patient discharged to home in stable condition.    Disposition   The patient was discharged.     Diagnosis      ICD-10-CM    1. Thrombophlebitis of superficial veins of right lower extremity  I80.01     upper small saphenous vein           Discharge Medications   Discharge Medication List as of 11/30/2024  4:07 PM        START taking these medications    Details   rivaroxaban ANTICOAGULANT (XARELTO) 10 MG TABS tablet Take 1 tablet (10 mg) by mouth daily (with dinner)., Disp-45 tablet, R-0, Local Print             This record was created at least in part using electronic voice recognition software, so please excuse any typographical errors.         Kurt Joy PA-C  11/30/24 6001

## 2024-11-30 NOTE — ED TRIAGE NOTES
Pt c/o R calf pain for 3-5 days, redness and swelling noted to R leg, no AC, hx A-fib, ABCD intact.       Triage Assessment (Adult)       Row Name 11/30/24 1311          Triage Assessment    Airway WDL WDL        Respiratory WDL    Respiratory WDL WDL        Skin Circulation/Temperature WDL    Skin Circulation/Temperature WDL X  R leg swelling and redness        Cardiac WDL    Cardiac WDL WDL        Peripheral/Neurovascular WDL    Peripheral Neurovascular WDL WDL        Cognitive/Neuro/Behavioral WDL    Cognitive/Neuro/Behavioral WDL WDL

## 2024-12-02 LAB
ATRIAL RATE - MUSE: NORMAL BPM
DIASTOLIC BLOOD PRESSURE - MUSE: NORMAL MMHG
INTERPRETATION ECG - MUSE: NORMAL
P AXIS - MUSE: NORMAL DEGREES
PR INTERVAL - MUSE: NORMAL MS
QRS DURATION - MUSE: 90 MS
QT - MUSE: 362 MS
QTC - MUSE: 433 MS
R AXIS - MUSE: 94 DEGREES
SYSTOLIC BLOOD PRESSURE - MUSE: NORMAL MMHG
T AXIS - MUSE: 59 DEGREES
VENTRICULAR RATE- MUSE: 86 BPM

## 2025-01-16 ENCOUNTER — TELEPHONE (OUTPATIENT)
Dept: CARDIOLOGY | Facility: CLINIC | Age: 75
End: 2025-01-16
Payer: COMMERCIAL

## 2025-01-16 DIAGNOSIS — I48.20 CHRONIC ATRIAL FIBRILLATION (H): ICD-10-CM

## 2025-01-16 RX ORDER — METOPROLOL SUCCINATE 25 MG/1
25 TABLET, EXTENDED RELEASE ORAL DAILY
Qty: 90 TABLET | Refills: 0 | Status: SHIPPED | OUTPATIENT
Start: 2025-01-16

## 2025-03-20 DIAGNOSIS — I48.20 CHRONIC ATRIAL FIBRILLATION (H): ICD-10-CM

## 2025-03-20 RX ORDER — METOPROLOL SUCCINATE 25 MG/1
25 TABLET, EXTENDED RELEASE ORAL DAILY
Qty: 30 TABLET | Refills: 0 | Status: SHIPPED | OUTPATIENT
Start: 2025-03-20

## 2025-04-11 ENCOUNTER — TELEPHONE (OUTPATIENT)
Dept: CARDIOLOGY | Facility: CLINIC | Age: 75
End: 2025-04-11
Payer: COMMERCIAL

## 2025-04-11 NOTE — TELEPHONE ENCOUNTER
[facility] Cardiology Refill Guideline reviewed.  Medication does not meet criteria for refill due to overdue for follow up.  Messaged to providers team for further review.      Attempted to call patient regarding request to refill his metoprolol succinate as patient is overdue for follow-up.,  Left message for patient to call back at [phone]. Behzad WARE

## 2025-04-16 DIAGNOSIS — I48.20 CHRONIC ATRIAL FIBRILLATION (H): ICD-10-CM

## 2025-04-16 RX ORDER — METOPROLOL SUCCINATE 25 MG/1
25 TABLET, EXTENDED RELEASE ORAL DAILY
Qty: 90 TABLET | Refills: 1 | Status: SHIPPED | OUTPATIENT
Start: 2025-04-16

## 2025-05-13 ENCOUNTER — TRANSFERRED RECORDS (OUTPATIENT)
Dept: HEALTH INFORMATION MANAGEMENT | Facility: CLINIC | Age: 75
End: 2025-05-13
Payer: COMMERCIAL

## 2025-05-20 NOTE — PROGRESS NOTES
Cardiology Clinic Progress Note  Pan Bills MRN# [id number]   YOB: 1950 Age: 74 year old   Primary Cardiologist: Dr. Ayaz Olmedo  Reason for visit: Follow up             Assessment and Plan:   Pan Bills is a 74 year old male who is here today for follow up.      1.  Permanent Atrial Fibrillation -[id number] score of 1 (age +1), patient has preferred to be on aspirin.  Of note he does turn 75 10/2025 and would require anticoagulation at that time.  Patient preferred rate control over rhythm control.  On metoprolol succinate 25 mg daily.    2. Moderate Mitral Regurgitation -TTE 2/2023 showed moderate mitral valve regurgitation.  Recommend repeat TTE to follow this.    3. Nicotine use - recommend complete cessation      Today's Plan:   - Pan presents today for annual follow-up.  He is overall doing well from a cardiac standpoint.  Currently having issues with dizziness in which he is seeing the dizzy clinic and doing further workup with his primary care provider.     - Regarding his atrial fibrillation he is fairly asymptomatic with this.  EKG today does show rate controlled atrial fibrillation.  It appears that he is fairly rate controlled on metoprolol succinate 25 mg daily.  He does check his heart rates often with his watch and Kardia device.  Did bring up the discussion of starting anticoagulation as this October he will be 75 which increases his [id number]-YRHx score 2.  He would like to think about this prior to starting this medication.  We will continue aspirin at this time given [id number] score 1.    -Recommend repeat echocardiogram to reevaluate moderate MR seen in echo 2/2023    Follow up plan:   - Follow-up in 4 months to discuss starting anticoagulation for atrial fibrillation      Charline Triplett PA-C  [facility]        History of Presenting Illness:    Pan Bills is a very pleasant 74 year old male with permanent atrial fibrillation, mitral  regurgitation, chronic venous insufficiency, history of adenocarcinoma of the colon s/p resection, and nicotine use.    Pan was last seen Dr. Ayaz Olmedo, primary cardiologist 1/2024.  At the time he was doing well from a cardiac standpoint.  It was noted his blood pressure was initially quite hypertensive but did improve at the end of the visit.  Anticoagulation was once again discussed as he has a chads Vascor of 1 (age) but it was noted that 10/2025 patient would be 75 which would increase his Fred Vascor to 2 mm each time patient would benefit from anticoagulation.  At that time he restarted aspirin alone per patient's choice.  Rhythm control has been discussed in the past with Dr. Calderon patient preferred conservative management.  Echocardiogram had been recommended to follow mitral regurgitation.  This has not yet been done.     He was seen in the emergency department 11/30/24 for lower leg swelling, pain, and redness that began 3 to 5 days prior to presentation.  Lower extremity duplex showed no DVT, superficial thrombus lightest involving upper small saphenous vein and popliteal region of superficial varicosities.  He was started on a short course of anticoagulation.    Patient is here today for annual follow-up. Patient reports feeling okay.  He has noticed over the past 6 to 12 months he has had some dizziness.  This is worsened over the past few weeks in which he is discussed with his primary care provider.  She sent him to the dizzy clinic which he is working with therapy to help improve his symptoms.  Additionally his primary care provider ordered an MRI brain which he completed yesterday.  From a cardiac standpoint he is overall doing quite well.  He denies any chest pain, palpitations, near-syncope, or syncope, shortness of breath, orthopnea, or PND.  He does note sometimes his watch will tell him he briefly has a heart rate above 100 or upper 40s.  Did offer Zio monitor but patient would like to  hold off at this time he monitors his heart rates fairly closely with his watch and Kardia device.  I discussed with him if he notices increased variation in his rates to let us know and we can do a monitor.  He is unsure if he wants to be on anticoagulation or not given he is concerned for side effects, he would like to think about it and discuss it in a few months.  Typically he is a fairly active person recently having to cut back given the dizziness.  He has previously ridden bike and then trail running for multiple hours at a time.    Blood pressure 132/86 and HR 85 in clinic today.  Weight today 186 lbs.         Social History       Social History     Socioeconomic History    Marital status:      Spouse name: Not on file    Number of children: Not on file    Years of education: Not on file    Highest education level: Not on file   Occupational History    Not on file   Tobacco Use    Smoking status: Some Days     Types: Cigars    Smokeless tobacco: Never    Tobacco comments:     pt smokes 1 cigar per week   Substance and Sexual Activity    Alcohol use: No     Alcohol/week: 0.0 standard drinks of alcohol    Drug use: No    Sexual activity: Not on file   Other Topics Concern    Parent/sibling w/ CABG, MI or angioplasty before 65F 55M? Not Asked     Service Not Asked    Blood Transfusions Not Asked    Caffeine Concern Yes     Comment: 2-3 cups/day    Occupational Exposure Not Asked    Hobby Hazards Not Asked    Sleep Concern No    Stress Concern No    Weight Concern No    Special Diet No    Back Care Not Asked    Exercise Yes     Comment: running 1-2x/week x1.5 hours    Bike Helmet Not Asked    Seat Belt Yes    Self-Exams Not Asked   Social History Narrative    Not on file     Social Drivers of Health     Financial Resource Strain: Not on file   Food Insecurity: Not on file   Transportation Needs: Not on file   Physical Activity: Not on file   Stress: Not on file   Social Connections: Not on file  "  Interpersonal Safety: Not on file   Housing Stability: Not on file            Review of Systems:   Please see HPI         Physical Exam:   Vitals: There were no vitals taken for this visit.   Wt Readings from Last 4 Encounters:   06/17/24 78 kg (172 lb)   01/29/24 81.6 kg (179 lb 14.4 oz)   03/31/23 82.4 kg (181 lb 11.2 oz)   02/15/23 83.6 kg (184 lb 3.2 oz)     GEN: well nourished, in no acute distress.  HEENT:  Pupils equal, round. Sclerae nonicteric.   NECK: Supple, no masses appreciated.  No JVD with patient.  C/V: Irregularly irregular rhythm, normal rate, no murmur, rub or gallop.   RESP: Respirations are unlabored. Clear to auscultation bilaterally without wheezing, rales, or rhonchi.  GI: Abdomen soft, nontender.  EXTREM: No LE edema.  NEURO: Alert and oriented, cooperative.  SKIN: Warm and dry.        Data:   LIPID RESULTS:  Lab Results   Component Value Date    CHOL 170 05/05/2014    HDL 67 05/05/2014    LDL 91 05/05/2014    TRIG 59 10/10/2023    TRIG 62 05/05/2014     LIVER ENZYME RESULTS:  Lab Results   Component Value Date    AST 26 05/05/2014    ALT 20 05/05/2014     CBC RESULTS:  Lab Results   Component Value Date    WBC 7.1 11/30/2024    RBC 4.98 11/30/2024    HGB 16.3 11/30/2024    HCT 47.0 11/30/2024    MCV 94 11/30/2024    MCH 32.7 11/30/2024    MCHC 34.7 11/30/2024    RDW 12.7 11/30/2024     11/30/2024     BMP RESULTS:  Lab Results   Component Value Date     11/30/2024     05/05/2014    POTASSIUM 5.2 11/30/2024    POTASSIUM 4.3 05/05/2014    CHLORIDE 99 04/21/2025    CHLORIDE 100 05/05/2014    CO2 27 11/30/2024    ANIONGAP 10 11/30/2024    ANIONGAP 1.7 05/05/2014     (H) 11/30/2024    GLC 87 05/05/2014    BUN 18.9 11/30/2024    BUN 18 05/05/2014    CR 0.95 11/30/2024    CR 1.00 05/05/2014    GFRESTIMATED 84 11/30/2024    CAROLE 9.2 11/30/2024    CAROLE 9.4 05/05/2014      A1C RESULTS:  No results found for: \"A1C\"  INR RESULTS:  No results found for: \"INR\"         Medications "     Current Outpatient Medications   Medication Sig Dispense Refill    aspirin (ASA) 81 MG chewable tablet Take 81 mg by mouth daily      metoprolol succinate ER (TOPROL XL) 25 MG 24 hr tablet Take 1 tablet (25 mg) by mouth daily. 90 tablet 1    rivaroxaban ANTICOAGULANT (XARELTO) 10 MG TABS tablet Take 1 tablet (10 mg) by mouth daily (with dinner). 45 tablet 0          Past Medical History     Past Medical History:   Diagnosis Date    Atrial fibrillation (H)     Cancer (H)     COLON    Mitral valve regurgitation     Mod-severe MR (2-3+) per 5/14 echo     Past Surgical History:   Procedure Laterality Date    COLONOSCOPY N/A 10/1/2015    Procedure: COLONOSCOPY;  Surgeon: Markus Moore MD;  Location:  GI    COLONOSCOPY N/A 1/24/2023    Procedure: COLONOSCOPY, FLEXIBLE, WITH LESION REMOVAL USING SNARE;  Surgeon: Ray Burgos MD;  Location:  GI    GI SURGERY      partial colon resection for CA    HERNIA REPAIR      (R) inguinal  hernia repair    HERNIORRHAPHY EPIGASTRIC N/A 7/6/2016    Procedure: HERNIORRHAPHY EPIGASTRIC;  Surgeon: Shorty Salomon MD;  Location: [facility]    HERNIORRHAPHY INGUINAL Right 7/6/2016    Procedure: HERNIORRHAPHY INGUINAL;  Surgeon: Shorty Salomon MD;  Location: [facility]    VASCULAR SURGERY      VARICOSE VEIN SURGERY BILAT     Family History   Problem Relation Age of Onset    Respiratory Mother     Cancer Father 65        Lung CA    Diabetes Maternal Grandmother     Cardiovascular Brother         Paige Parkinsons            Allergies   Patient has no known allergies.    35 minutes spent on the date of the encounter doing chart review, history and exam, documentation and further activities as noted above

## 2025-05-21 ENCOUNTER — OFFICE VISIT (OUTPATIENT)
Dept: CARDIOLOGY | Facility: CLINIC | Age: 75
End: 2025-05-21
Payer: COMMERCIAL

## 2025-05-21 VITALS
BODY MASS INDEX: 24.75 KG/M2 | SYSTOLIC BLOOD PRESSURE: 132 MMHG | HEIGHT: 73 IN | WEIGHT: 186.7 LBS | DIASTOLIC BLOOD PRESSURE: 86 MMHG | OXYGEN SATURATION: 99 % | HEART RATE: 85 BPM

## 2025-05-21 DIAGNOSIS — I48.20 CHRONIC ATRIAL FIBRILLATION (H): Primary | ICD-10-CM

## 2025-05-21 DIAGNOSIS — I34.0 NONRHEUMATIC MITRAL VALVE REGURGITATION: ICD-10-CM

## 2025-05-21 RX ORDER — MULTIVIT WITH MINERALS/LUTEIN
250 TABLET ORAL DAILY
COMMUNITY

## 2025-05-21 RX ORDER — OMEGA-3S/DHA/EPA/FISH OIL/D3 300MG-1000
1 CAPSULE ORAL DAILY
COMMUNITY

## 2025-05-21 NOTE — PATIENT INSTRUCTIONS
It was nice seeing you today, please call [phone] if you have any questions or concerns     Plan   No Medication Changes, continue current cardiac medications   Think about starting blood thinner - Eliquis, Xarelto, Pradaxa  Schedule Echocardiogram (heart ultrasound)  Follow up    - Follow up in 4 months to discuss blood thinner or sooner if needed   - Scheduling phone number [phone]    Charline Triplett PA-C  [facility]

## 2025-06-21 ENCOUNTER — HEALTH MAINTENANCE LETTER (OUTPATIENT)
Age: 75
End: 2025-06-21

## 2025-07-22 NOTE — LETTER
5/21/2025    Sheeba Alex MD  [address]  [facility] [address]    RE: Pan Bills       Dear Colleague,     I had the [facility] of seeing Pan Bills in the [facility].  Cardiology Clinic Progress Note  Pan Bills MRN# [id number]   YOB: 1950 Age: 74 year old   Primary Cardiologist: Dr. Ayaz Olmedo  Reason for visit: Follow up             Assessment and Plan:   Pan Bills is a 74 year old male who is here today for follow up.      1.  Permanent Atrial Fibrillation -[id number]-SSGt score of 1 (age +1), patient has preferred to be on aspirin.  Of note he does turn 75 10/2025 and would require anticoagulation at that time.  Patient preferred rate control over rhythm control.  On metoprolol succinate 25 mg daily.    2. Moderate Mitral Regurgitation -TTE 2/2023 showed moderate mitral valve regurgitation.  Recommend repeat TTE to follow this.    3. Nicotine use - recommend complete cessation      Today's Plan:   - Pan presents today for annual follow-up.  He is overall doing well from a cardiac standpoint.  Currently having issues with dizziness in which he is seeing the dizzy clinic and doing further workup with his primary care provider.     - Regarding his atrial fibrillation he is fairly asymptomatic with this.  EKG today does show rate controlled atrial fibrillation.  It appears that he is fairly rate controlled on metoprolol succinate 25 mg daily.  He does check his heart rates often with his watch and Kardia device.  Did bring up the discussion of starting anticoagulation as this October he will be 75 which increases his [id number]-[id number] score 2.  He would like to think about this prior to starting this medication.  We will continue aspirin at this time given [id number] score 1.    -Recommend repeat echocardiogram to reevaluate moderate MR seen in echo 2/2023    Follow up plan:   - Follow-up in 4 months to discuss starting anticoagulation for atrial  Anesthesia Transfer of Care Note    Patient: Jillian Murillo    Procedure(s) Performed: Procedure(s) (LRB):  ULTRASOUND, UPPER GI TRACT, ENDOSCOPIC (N/A)    Patient location: GI    Anesthesia Type: general    Transport from OR: Transported from OR on room air with adequate spontaneous ventilation    Post pain: adequate analgesia    Post assessment: no apparent anesthetic complications and tolerated procedure well    Post vital signs: stable    Level of consciousness: sedated    Nausea/Vomiting: no nausea/vomiting    Complications: none    Transfer of care protocol was followed      Last vitals: Visit Vitals  BP (!) 159/94 (BP Location: Left arm, Patient Position: Lying)   Pulse 64   Temp 36.7 °C (98 °F)   Resp 16   LMP 06/14/2024   SpO2 100%   Breastfeeding No      fibrillation      Charline Triplett PA-C  [facility]        History of Presenting Illness:    Pan Bills is a very pleasant 74 year old male with permanent atrial fibrillation, mitral regurgitation, chronic venous insufficiency, history of adenocarcinoma of the colon s/p resection, and nicotine use.    Pan was last seen Dr. Ayaz Olmedo, primary cardiologist 1/2024.  At the time he was doing well from a cardiac standpoint.  It was noted his blood pressure was initially quite hypertensive but did improve at the end of the visit.  Anticoagulation was once again discussed as he has a chads Vascor of 1 (age) but it was noted that 10/2025 patient would be 75 which would increase his Fred Vascor to 2 mm each time patient would benefit from anticoagulation.  At that time he restarted aspirin alone per patient's choice.  Rhythm control has been discussed in the past with Dr. Calderon patient preferred conservative management.  Echocardiogram had been recommended to follow mitral regurgitation.  This has not yet been done.     He was seen in the emergency department 11/30/24 for lower leg swelling, pain, and redness that began 3 to 5 days prior to presentation.  Lower extremity duplex showed no DVT, superficial thrombus lightest involving upper small saphenous vein and popliteal region of superficial varicosities.  He was started on a short course of anticoagulation.    Patient is here today for annual follow-up. Patient reports feeling okay.  He has noticed over the past 6 to 12 months he has had some dizziness.  This is worsened over the past few weeks in which he is discussed with his primary care provider.  She sent him to the dizzy clinic which he is working with therapy to help improve his symptoms.  Additionally his primary care provider ordered an MRI brain which he completed yesterday.  From a cardiac standpoint he is overall doing quite well.  He denies any chest pain, palpitations,  near-syncope, or syncope, shortness of breath, orthopnea, or PND.  He does note sometimes his watch will tell him he briefly has a heart rate above 100 or upper 40s.  Did offer Zio monitor but patient would like to hold off at this time he monitors his heart rates fairly closely with his watch and Kardia device.  I discussed with him if he notices increased variation in his rates to let us know and we can do a monitor.  He is unsure if he wants to be on anticoagulation or not given he is concerned for side effects, he would like to think about it and discuss it in a few months.  Typically he is a fairly active person recently having to cut back given the dizziness.  He has previously ridden bike and then trail running for multiple hours at a time.    Blood pressure 132/86 and HR 85 in clinic today.  Weight today 186 lbs.         Social History       Social History     Socioeconomic History     Marital status:      Spouse name: Not on file     Number of children: Not on file     Years of education: Not on file     Highest education level: Not on file   Occupational History     Not on file   Tobacco Use     Smoking status: Some Days     Types: Cigars     Smokeless tobacco: Never     Tobacco comments:     pt smokes 1 cigar per week   Substance and Sexual Activity     Alcohol use: No     Alcohol/week: 0.0 standard drinks of alcohol     Drug use: No     Sexual activity: Not on file   Other Topics Concern     Parent/sibling w/ CABG, MI or angioplasty before 65F 55M? Not Asked      Service Not Asked     Blood Transfusions Not Asked     Caffeine Concern Yes     Comment: 2-3 cups/day     Occupational Exposure Not Asked     Hobby Hazards Not Asked     Sleep Concern No     Stress Concern No     Weight Concern No     Special Diet No     Back Care Not Asked     Exercise Yes     Comment: running 1-2x/week x1.5 hours     Bike Helmet Not Asked     Seat Belt Yes     Self-Exams Not Asked   Social History Narrative      Not on file     Social Drivers of Health     Financial Resource Strain: Not on file   Food Insecurity: Not on file   Transportation Needs: Not on file   Physical Activity: Not on file   Stress: Not on file   Social Connections: Not on file   Interpersonal Safety: Not on file   Housing Stability: Not on file            Review of Systems:   Please see HPI         Physical Exam:   Vitals: There were no vitals taken for this visit.   Wt Readings from Last 4 Encounters:   06/17/24 78 kg (172 lb)   01/29/24 81.6 kg (179 lb 14.4 oz)   03/31/23 82.4 kg (181 lb 11.2 oz)   02/15/23 83.6 kg (184 lb 3.2 oz)     GEN: well nourished, in no acute distress.  HEENT:  Pupils equal, round. Sclerae nonicteric.   NECK: Supple, no masses appreciated.  No JVD with patient.  C/V: Irregularly irregular rhythm, normal rate, no murmur, rub or gallop.   RESP: Respirations are unlabored. Clear to auscultation bilaterally without wheezing, rales, or rhonchi.  GI: Abdomen soft, nontender.  EXTREM: No LE edema.  NEURO: Alert and oriented, cooperative.  SKIN: Warm and dry.        Data:   LIPID RESULTS:  Lab Results   Component Value Date    CHOL 170 05/05/2014    HDL 67 05/05/2014    LDL 91 05/05/2014    TRIG 59 10/10/2023    TRIG 62 05/05/2014     LIVER ENZYME RESULTS:  Lab Results   Component Value Date    AST 26 05/05/2014    ALT 20 05/05/2014     CBC RESULTS:  Lab Results   Component Value Date    WBC 7.1 11/30/2024    RBC 4.98 11/30/2024    HGB 16.3 11/30/2024    HCT 47.0 11/30/2024    MCV 94 11/30/2024    MCH 32.7 11/30/2024    MCHC 34.7 11/30/2024    RDW 12.7 11/30/2024     11/30/2024     BMP RESULTS:  Lab Results   Component Value Date     11/30/2024     05/05/2014    POTASSIUM 5.2 11/30/2024    POTASSIUM 4.3 05/05/2014    CHLORIDE 99 04/21/2025    CHLORIDE 100 05/05/2014    CO2 27 11/30/2024    ANIONGAP 10 11/30/2024    ANIONGAP 1.7 05/05/2014     (H) 11/30/2024    GLC 87 05/05/2014    BUN 18.9 11/30/2024    BUN  "18 05/05/2014    CR 0.95 11/30/2024    CR 1.00 05/05/2014    GFRESTIMATED 84 11/30/2024    CAROLE 9.2 11/30/2024    CAROLE 9.4 05/05/2014      A1C RESULTS:  No results found for: \"A1C\"  INR RESULTS:  No results found for: \"INR\"         Medications     Current Outpatient Medications   Medication Sig Dispense Refill     aspirin (ASA) 81 MG chewable tablet Take 81 mg by mouth daily       metoprolol succinate ER (TOPROL XL) 25 MG 24 hr tablet Take 1 tablet (25 mg) by mouth daily. 90 tablet 1     rivaroxaban ANTICOAGULANT (XARELTO) 10 MG TABS tablet Take 1 tablet (10 mg) by mouth daily (with dinner). 45 tablet 0          Past Medical History     Past Medical History:   Diagnosis Date     Atrial fibrillation (H)      Cancer (H)     COLON     Mitral valve regurgitation     Mod-severe MR (2-3+) per 5/14 echo     Past Surgical History:   Procedure Laterality Date     COLONOSCOPY N/A 10/1/2015    Procedure: COLONOSCOPY;  Surgeon: Markus Moore MD;  Location:  GI     COLONOSCOPY N/A 1/24/2023    Procedure: COLONOSCOPY, FLEXIBLE, WITH LESION REMOVAL USING SNARE;  Surgeon: Ray Burgos MD;  Location:  GI     GI SURGERY      partial colon resection for CA     HERNIA REPAIR      (R) inguinal  hernia repair     HERNIORRHAPHY EPIGASTRIC N/A 7/6/2016    Procedure: HERNIORRHAPHY EPIGASTRIC;  Surgeon: Shorty Salomon MD;  Location: [facility]     HERNIORRHAPHY INGUINAL Right 7/6/2016    Procedure: HERNIORRHAPHY INGUINAL;  Surgeon: Shorty Salomon MD;  Location: [facility]     VASCULAR SURGERY      VARICOSE VEIN SURGERY BILAT     Family History   Problem Relation Age of Onset     Respiratory Mother      Cancer Father 65        Lung CA     Diabetes Maternal Grandmother      Cardiovascular Brother         Paige Parkinsons            Allergies   Patient has no known allergies.    35 minutes spent on the date of the encounter doing chart review, history and exam, documentation and further activities as noted above    Thank you " for allowing me to participate in the care of your patient.      Sincerely,     Charline Triplett PA-C     [facility]  cc:   Timoteo Rodarte MD  [address]  [place]  [place] [address]

## 2025-08-29 ENCOUNTER — HOSPITAL ENCOUNTER (OUTPATIENT)
Dept: CARDIOLOGY | Facility: CLINIC | Age: 75
Discharge: HOME | End: 2025-08-29
Attending: STUDENT IN AN ORGANIZED HEALTH CARE EDUCATION/TRAINING PROGRAM | Admitting: STUDENT IN AN ORGANIZED HEALTH CARE EDUCATION/TRAINING PROGRAM
Payer: COMMERCIAL

## 2025-08-29 DIAGNOSIS — I48.20 CHRONIC ATRIAL FIBRILLATION (H): ICD-10-CM

## 2025-08-29 DIAGNOSIS — I34.0 NONRHEUMATIC MITRAL VALVE REGURGITATION: ICD-10-CM

## 2025-08-29 LAB — LVEF ECHO: NORMAL

## 2025-08-29 PROCEDURE — 93306 TTE W/DOPPLER COMPLETE: CPT | Mod: 26 | Performed by: INTERNAL MEDICINE

## 2025-08-29 PROCEDURE — 93306 TTE W/DOPPLER COMPLETE: CPT

## (undated) DEVICE — SOL WATER IRRIG 1000ML BOTTLE 2F7114

## (undated) RX ORDER — FENTANYL CITRATE 50 UG/ML
INJECTION, SOLUTION INTRAMUSCULAR; INTRAVENOUS
Status: DISPENSED
Start: 2023-01-24